# Patient Record
Sex: MALE | Race: OTHER | HISPANIC OR LATINO | Employment: FULL TIME | URBAN - METROPOLITAN AREA
[De-identification: names, ages, dates, MRNs, and addresses within clinical notes are randomized per-mention and may not be internally consistent; named-entity substitution may affect disease eponyms.]

---

## 2018-08-27 ENCOUNTER — OFFICE VISIT (OUTPATIENT)
Dept: FAMILY MEDICINE CLINIC | Facility: CLINIC | Age: 33
End: 2018-08-27
Payer: COMMERCIAL

## 2018-08-27 VITALS
BODY MASS INDEX: 23.95 KG/M2 | TEMPERATURE: 98 F | RESPIRATION RATE: 14 BRPM | HEIGHT: 66 IN | HEART RATE: 68 BPM | WEIGHT: 149 LBS | SYSTOLIC BLOOD PRESSURE: 126 MMHG | DIASTOLIC BLOOD PRESSURE: 74 MMHG

## 2018-08-27 DIAGNOSIS — R35.89 POLYURIA: Primary | ICD-10-CM

## 2018-08-27 DIAGNOSIS — N39.0 URINARY TRACT INFECTION WITH HEMATURIA, SITE UNSPECIFIED: Primary | ICD-10-CM

## 2018-08-27 DIAGNOSIS — R31.9 URINARY TRACT INFECTION WITH HEMATURIA, SITE UNSPECIFIED: Primary | ICD-10-CM

## 2018-08-27 DIAGNOSIS — R51.9 NONINTRACTABLE HEADACHE, UNSPECIFIED CHRONICITY PATTERN, UNSPECIFIED HEADACHE TYPE: ICD-10-CM

## 2018-08-27 LAB
SL AMB  POCT GLUCOSE, UA: ABNORMAL
SL AMB LEUKOCYTE ESTERASE,UA: ABNORMAL
SL AMB POCT BILIRUBIN,UA: 1
SL AMB POCT BLOOD,UA: ABNORMAL
SL AMB POCT CLARITY,UA: CLEAR
SL AMB POCT COLOR,UA: YELLOW
SL AMB POCT KETONES,UA: 5
SL AMB POCT NITRITE,UA: ABNORMAL
SL AMB POCT PH,UA: 5
SL AMB POCT SPECIFIC GRAVITY,UA: 1.03
SL AMB POCT URINE PROTEIN: 15
SL AMB POCT UROBILINOGEN: 0.2

## 2018-08-27 PROCEDURE — 3008F BODY MASS INDEX DOCD: CPT | Performed by: FAMILY MEDICINE

## 2018-08-27 PROCEDURE — 81002 URINALYSIS NONAUTO W/O SCOPE: CPT | Performed by: FAMILY MEDICINE

## 2018-08-27 PROCEDURE — 99204 OFFICE O/P NEW MOD 45 MIN: CPT | Performed by: FAMILY MEDICINE

## 2018-08-27 NOTE — PROGRESS NOTES
Assessment/Plan:    No problem-specific Assessment & Plan notes found for this encounter  Diagnoses and all orders for this visit:    Polyuria  Comments:  had seen doctor in [facility] who told him he had prostate problems  Orders:  -     Comprehensive metabolic panel; Future  -     CBC and differential; Future  -     Ambulatory referral to Urology; Future          Subjective:      Patient ID: Naheed Simon is a 29 y o  male  eval for urinary issues-goes to bathroom a lot, would like to see urology,also head trauma as child and still has scar, getting more headaches lately        The following portions of the patient's history were reviewed and updated as appropriate: allergies, current medications, past family history, past medical history, past social history, past surgical history and problem list     Review of Systems   Constitutional: Negative for activity change, appetite change and unexpected weight change  Respiratory: Negative for shortness of breath  Cardiovascular: Negative for chest pain  Gastrointestinal: Positive for abdominal pain  Genitourinary: Positive for difficulty urinating  Neurological: Positive for dizziness and headaches  Objective:      /74 (BP Location: Left arm, Patient Position: Sitting, Cuff Size: Adult)   Pulse 68   Temp 98 °F (36 7 °C) (Temporal)   Resp 14   Ht 5' 6 06" (1 678 m)   Wt 67 6 kg (149 lb)   BMI 24 00 kg/m²          Physical Exam   Constitutional: He appears well-developed and well-nourished  HENT:   Right Ear: Tympanic membrane normal    Left Ear: Tympanic membrane normal    Neck: No thyromegaly present  Cardiovascular: Normal rate, regular rhythm and normal heart sounds  Pulmonary/Chest: Breath sounds normal    Abdominal: There is no tenderness  Lymphadenopathy:     He has no cervical adenopathy  Skin:   Scar r side of head   Vitals reviewed

## 2018-09-07 ENCOUNTER — TELEPHONE (OUTPATIENT)
Dept: FAMILY MEDICINE CLINIC | Facility: CLINIC | Age: 33
End: 2018-09-07

## 2018-09-11 ENCOUNTER — TELEPHONE (OUTPATIENT)
Dept: FAMILY MEDICINE CLINIC | Facility: CLINIC | Age: 33
End: 2018-09-11

## 2018-09-11 ENCOUNTER — APPOINTMENT (OUTPATIENT)
Dept: LAB | Facility: HOSPITAL | Age: 33
End: 2018-09-11
Payer: COMMERCIAL

## 2018-09-11 DIAGNOSIS — R35.89 POLYURIA: ICD-10-CM

## 2018-09-11 LAB
ALBUMIN SERPL BCP-MCNC: 3.9 G/DL (ref 3.5–5)
ALP SERPL-CCNC: 38 U/L (ref 46–116)
ALT SERPL W P-5'-P-CCNC: 22 U/L (ref 12–78)
ANION GAP SERPL CALCULATED.3IONS-SCNC: 4 MMOL/L (ref 4–13)
AST SERPL W P-5'-P-CCNC: 21 U/L (ref 5–45)
BASOPHILS # BLD AUTO: 0.02 THOUSANDS/ΜL (ref 0–0.1)
BASOPHILS NFR BLD AUTO: 0 % (ref 0–1)
BILIRUB SERPL-MCNC: 0.64 MG/DL (ref 0.2–1)
BUN SERPL-MCNC: 19 MG/DL (ref 5–25)
CALCIUM SERPL-MCNC: 8.9 MG/DL (ref 8.3–10.1)
CHLORIDE SERPL-SCNC: 106 MMOL/L (ref 100–108)
CO2 SERPL-SCNC: 31 MMOL/L (ref 21–32)
CREAT SERPL-MCNC: 1.08 MG/DL (ref 0.6–1.3)
EOSINOPHIL # BLD AUTO: 0.15 THOUSAND/ΜL (ref 0–0.61)
EOSINOPHIL NFR BLD AUTO: 3 % (ref 0–6)
ERYTHROCYTE [DISTWIDTH] IN BLOOD BY AUTOMATED COUNT: 13.7 % (ref 11.6–15.1)
GFR SERPL CREATININE-BSD FRML MDRD: 92 ML/MIN/1.73SQ M
GLUCOSE P FAST SERPL-MCNC: 91 MG/DL (ref 65–99)
HCT VFR BLD AUTO: 46.7 % (ref 36.5–49.3)
HGB BLD-MCNC: 14.9 G/DL (ref 12–17)
IMM GRANULOCYTES # BLD AUTO: 0.01 THOUSAND/UL (ref 0–0.2)
IMM GRANULOCYTES NFR BLD AUTO: 0 % (ref 0–2)
LYMPHOCYTES # BLD AUTO: 1.86 THOUSANDS/ΜL (ref 0.6–4.47)
LYMPHOCYTES NFR BLD AUTO: 35 % (ref 14–44)
MCH RBC QN AUTO: 28.4 PG (ref 26.8–34.3)
MCHC RBC AUTO-ENTMCNC: 31.9 G/DL (ref 31.4–37.4)
MCV RBC AUTO: 89 FL (ref 82–98)
MONOCYTES # BLD AUTO: 0.43 THOUSAND/ΜL (ref 0.17–1.22)
MONOCYTES NFR BLD AUTO: 8 % (ref 4–12)
NEUTROPHILS # BLD AUTO: 2.78 THOUSANDS/ΜL (ref 1.85–7.62)
NEUTS SEG NFR BLD AUTO: 54 % (ref 43–75)
NRBC BLD AUTO-RTO: 0 /100 WBCS
PLATELET # BLD AUTO: 198 THOUSANDS/UL (ref 149–390)
PMV BLD AUTO: 10.4 FL (ref 8.9–12.7)
POTASSIUM SERPL-SCNC: 4 MMOL/L (ref 3.5–5.3)
PROT SERPL-MCNC: 7.4 G/DL (ref 6.4–8.2)
RBC # BLD AUTO: 5.24 MILLION/UL (ref 3.88–5.62)
SODIUM SERPL-SCNC: 141 MMOL/L (ref 136–145)
WBC # BLD AUTO: 5.25 THOUSAND/UL (ref 4.31–10.16)

## 2018-09-11 PROCEDURE — 80053 COMPREHEN METABOLIC PANEL: CPT

## 2018-09-11 PROCEDURE — 36415 COLL VENOUS BLD VENIPUNCTURE: CPT

## 2018-09-11 PROCEDURE — 85025 COMPLETE CBC W/AUTO DIFF WBC: CPT

## 2018-09-12 ENCOUNTER — TELEPHONE (OUTPATIENT)
Dept: FAMILY MEDICINE CLINIC | Facility: CLINIC | Age: 33
End: 2018-09-12

## 2018-09-12 NOTE — TELEPHONE ENCOUNTER
Pt returned call and was notified that he can schedule his CT scan, I gave him the number to central scheduling as well

## 2018-09-13 ENCOUNTER — TELEPHONE (OUTPATIENT)
Dept: FAMILY MEDICINE CLINIC | Facility: CLINIC | Age: 33
End: 2018-09-13

## 2018-09-24 ENCOUNTER — HOSPITAL ENCOUNTER (OUTPATIENT)
Dept: CT IMAGING | Facility: HOSPITAL | Age: 33
Discharge: HOME | End: 2018-09-24
Payer: COMMERCIAL

## 2018-09-24 DIAGNOSIS — R51.9 NONINTRACTABLE HEADACHE, UNSPECIFIED CHRONICITY PATTERN, UNSPECIFIED HEADACHE TYPE: ICD-10-CM

## 2018-09-24 PROCEDURE — 70450 CT HEAD/BRAIN W/O DYE: CPT

## 2018-09-25 ENCOUNTER — TELEPHONE (OUTPATIENT)
Dept: FAMILY MEDICINE CLINIC | Facility: CLINIC | Age: 33
End: 2018-09-25

## 2018-09-25 NOTE — TELEPHONE ENCOUNTER
----- Message from Flavia Zimmerman MD sent at 9/24/2018 10:24 PM EDT -----  Positive sinus disease-rec 3 weeks of amoxil 875 bid

## 2018-09-26 DIAGNOSIS — J32.9 SINUSITIS, UNSPECIFIED CHRONICITY, UNSPECIFIED LOCATION: Primary | ICD-10-CM

## 2018-09-26 RX ORDER — AMOXICILLIN 875 MG/1
875 TABLET, COATED ORAL 2 TIMES DAILY
Qty: 42 TABLET | Refills: 0 | Status: SHIPPED | OUTPATIENT
Start: 2018-09-26 | End: 2018-10-17

## 2018-09-26 RX ORDER — PANTOPRAZOLE SODIUM 40 MG/1
40 TABLET, DELAYED RELEASE ORAL
COMMUNITY
Start: 2018-05-31

## 2018-10-01 ENCOUNTER — OFFICE VISIT (OUTPATIENT)
Dept: UROLOGY | Facility: MEDICAL CENTER | Age: 33
End: 2018-10-01
Payer: COMMERCIAL

## 2018-10-01 VITALS
SYSTOLIC BLOOD PRESSURE: 122 MMHG | WEIGHT: 150 LBS | DIASTOLIC BLOOD PRESSURE: 74 MMHG | BODY MASS INDEX: 24.11 KG/M2 | HEIGHT: 66 IN

## 2018-10-01 DIAGNOSIS — R35.0 FREQUENCY OF MICTURITION: Primary | ICD-10-CM

## 2018-10-01 DIAGNOSIS — N41.0 PROSTATITIS, ACUTE: ICD-10-CM

## 2018-10-01 LAB
SL AMB  POCT GLUCOSE, UA: NORMAL
SL AMB LEUKOCYTE ESTERASE,UA: NORMAL
SL AMB POCT BILIRUBIN,UA: NORMAL
SL AMB POCT BLOOD,UA: NORMAL
SL AMB POCT CLARITY,UA: CLEAR
SL AMB POCT COLOR,UA: YELLOW
SL AMB POCT KETONES,UA: NORMAL
SL AMB POCT NITRITE,UA: NORMAL
SL AMB POCT PH,UA: 7.5
SL AMB POCT SPECIFIC GRAVITY,UA: 1.02
SL AMB POCT URINE PROTEIN: NORMAL
SL AMB POCT UROBILINOGEN: 1

## 2018-10-01 PROCEDURE — 99204 OFFICE O/P NEW MOD 45 MIN: CPT | Performed by: UROLOGY

## 2018-10-01 PROCEDURE — 81003 URINALYSIS AUTO W/O SCOPE: CPT | Performed by: UROLOGY

## 2018-10-01 RX ORDER — SULFAMETHOXAZOLE AND TRIMETHOPRIM 800; 160 MG/1; MG/1
1 TABLET ORAL EVERY 12 HOURS SCHEDULED
Qty: 60 TABLET | Refills: 0 | Status: SHIPPED | OUTPATIENT
Start: 2018-10-01 | End: 2018-10-31

## 2018-10-01 NOTE — LETTER
October 1, 2018     Monico Peng MD  [address]    Patient: Issa Frost   YOB: 1985   Date of Visit: 10/1/2018       Dear Dr Gemini Sharma:    Thank you for referring Issa Frost to me for evaluation  Below are my notes for this consultation  If you have questions, please do not hesitate to call me  I look forward to following your patient along with you  Sincerely,        Luz Quiñones MD        CC: No Recipients  Luz Quiñones MD  10/1/2018  4:07 PM  Sign at close encounter  Assessment/Plan:  1  Possible prostatitis causing symptoms of burning and urgency frequency  2   Bactrim twice per day for one month  3   If no better we would switched to Cipro for two weeks b i d  4   Follow-up six weeks  No problem-specific Assessment & Plan notes found for this encounter  Diagnoses and all orders for this visit:    Frequency of micturition  -     POCT urine dip auto non-scope  -     sulfamethoxazole-trimethoprim (BACTRIM DS) 800-160 mg per tablet; Take 1 tablet by mouth every 12 (twelve) hours for 30 days    Prostatitis, acute  -     sulfamethoxazole-trimethoprim (BACTRIM DS) 800-160 mg per tablet; Take 1 tablet by mouth every 12 (twelve) hours for 30 days          Subjective:      Patient ID: Issa Frost is a 29 y o  male  1-2 months of increased urinary frequency, urgency, small amounts voided, sense of poor emptying  Nocturia x2  Slight dysuria also  Denies fevers chills  Similar to episode in 2012 over he was treated at [address] [place] [address] urology for prostatitis  Patient states he had a cystoscopy then, but I cannot see any of those notes regarding a procedure on [facility]          The following portions of the patient's history were reviewed and updated as appropriate: allergies, current medications, past family history, past medical history, past social history, past surgical history and problem list     Review of Systems   All other systems reviewed and are negative  Objective:      /74   Ht 5' 6" (1 676 m)   Wt 68 kg (150 lb)   BMI 24 21 kg/m²           Physical Exam   Constitutional: He is oriented to person, place, and time  He appears well-developed and well-nourished  No distress  HENT:   Head: Normocephalic and atraumatic  Eyes: Conjunctivae are normal    Cardiovascular: Normal rate and regular rhythm  Pulmonary/Chest: Effort normal and breath sounds normal  No respiratory distress  He has no wheezes  Abdominal: Soft  Bowel sounds are normal  He exhibits no distension and no mass  There is no tenderness  Genitourinary:   Genitourinary Comments: Penis and testes normal    Perineum normal   Neurological: He is alert and oriented to person, place, and time  Skin: Skin is warm and dry  He is not diaphoretic

## 2018-10-01 NOTE — PROGRESS NOTES
Assessment/Plan:  1  Possible prostatitis causing symptoms of burning and urgency frequency  2   Bactrim twice per day for one month  3   If no better we would switched to Cipro for two weeks b i d  4   Follow-up six weeks  No problem-specific Assessment & Plan notes found for this encounter  Diagnoses and all orders for this visit:    Frequency of micturition  -     POCT urine dip auto non-scope  -     sulfamethoxazole-trimethoprim (BACTRIM DS) 800-160 mg per tablet; Take 1 tablet by mouth every 12 (twelve) hours for 30 days    Prostatitis, acute  -     sulfamethoxazole-trimethoprim (BACTRIM DS) 800-160 mg per tablet; Take 1 tablet by mouth every 12 (twelve) hours for 30 days          Subjective:      Patient ID: Abe Vines is a 29 y o  male  1-2 months of increased urinary frequency, urgency, small amounts voided, sense of poor emptying  Nocturia x2  Slight dysuria also  Denies fevers chills  Similar to episode in 2012 over he was treated at [address] [place] [address] urology for prostatitis  Patient states he had a cystoscopy then, but I cannot see any of those notes regarding a procedure on [facility]  The following portions of the patient's history were reviewed and updated as appropriate: allergies, current medications, past family history, past medical history, past social history, past surgical history and problem list     Review of Systems   All other systems reviewed and are negative  Objective:      /74   Ht 5' 6" (1 676 m)   Wt 68 kg (150 lb)   BMI 24 21 kg/m²          Physical Exam   Constitutional: He is oriented to person, place, and time  He appears well-developed and well-nourished  No distress  HENT:   Head: Normocephalic and atraumatic  Eyes: Conjunctivae are normal    Cardiovascular: Normal rate and regular rhythm  Pulmonary/Chest: Effort normal and breath sounds normal  No respiratory distress  He has no wheezes  Abdominal: Soft   Bowel sounds are normal  He exhibits no distension and no mass  There is no tenderness  Genitourinary:   Genitourinary Comments: Penis and testes normal    Perineum normal   Neurological: He is alert and oriented to person, place, and time  Skin: Skin is warm and dry  He is not diaphoretic

## 2018-12-03 ENCOUNTER — OFFICE VISIT (OUTPATIENT)
Dept: UROLOGY | Facility: MEDICAL CENTER | Age: 33
End: 2018-12-03
Payer: COMMERCIAL

## 2018-12-03 VITALS
HEART RATE: 67 BPM | WEIGHT: 145 LBS | DIASTOLIC BLOOD PRESSURE: 80 MMHG | HEIGHT: 66 IN | SYSTOLIC BLOOD PRESSURE: 122 MMHG | BODY MASS INDEX: 23.3 KG/M2

## 2018-12-03 DIAGNOSIS — N41.0 PROSTATITIS, ACUTE: ICD-10-CM

## 2018-12-03 DIAGNOSIS — R31.29 MICROHEMATURIA: Primary | ICD-10-CM

## 2018-12-03 LAB
SL AMB  POCT GLUCOSE, UA: ABNORMAL
SL AMB LEUKOCYTE ESTERASE,UA: ABNORMAL
SL AMB POCT BILIRUBIN,UA: ABNORMAL
SL AMB POCT BLOOD,UA: ABNORMAL
SL AMB POCT CLARITY,UA: CLEAR
SL AMB POCT COLOR,UA: YELLOW
SL AMB POCT KETONES,UA: ABNORMAL
SL AMB POCT NITRITE,UA: ABNORMAL
SL AMB POCT PH,UA: 6
SL AMB POCT SPECIFIC GRAVITY,UA: 1.03
SL AMB POCT URINE PROTEIN: ABNORMAL
SL AMB POCT UROBILINOGEN: 0.2

## 2018-12-03 PROCEDURE — 81003 URINALYSIS AUTO W/O SCOPE: CPT | Performed by: UROLOGY

## 2018-12-03 PROCEDURE — 99214 OFFICE O/P EST MOD 30 MIN: CPT | Performed by: UROLOGY

## 2018-12-03 RX ORDER — DOXYCYCLINE HYCLATE 100 MG/1
100 CAPSULE ORAL EVERY 12 HOURS SCHEDULED
Qty: 60 CAPSULE | Refills: 0 | Status: SHIPPED | OUTPATIENT
Start: 2018-12-03 | End: 2020-11-11 | Stop reason: SDUPTHER

## 2018-12-03 NOTE — LETTER
December 3, 2018     Jeanette Dai MD  [address]    Patient: Marly Lopez   YOB: 1985   Date of Visit: 12/3/2018       Dear Dr Yoko Velasquez:    Thank you for referring Marly Lopez to me for evaluation  Below are my notes for this consultation  If you have questions, please do not hesitate to call me  I look forward to following your patient along with you  Sincerely,        Sarah Doss MD        CC: No Recipients  Sarah Doss MD  12/3/2018  2:31 PM  Sign at close encounter  Assessment/Plan:  Possible prostatitis, chronic lower abdominal pain  Doxycycline 100 b i d  Times 30 days  CT scan  Follow-up six weeks  No problem-specific Assessment & Plan notes found for this encounter  Diagnoses and all orders for this visit:    Microhematuria  -     POCT urine dip auto non-scope  -     CT abdomen pelvis w wo contrast; Future    Prostatitis, acute  -     doxycycline hyclate (VIBRAMYCIN) 100 mg capsule; Take 1 capsule (100 mg total) by mouth every 12 (twelve) hours for 10 days          Subjective:      Patient ID: Marly Lopez is a 34 y o  male  1  Follow-up for lower abdominal pain, consistent with prostatitis  No help with Bactrim  Occasional perineal pain during urination, increased frequency of urination and some dysuria  The following portions of the patient's history were reviewed and updated as appropriate: allergies, current medications, past family history, past medical history, past social history, past surgical history and problem list     Review of Systems   All other systems reviewed and are negative  Objective:      /80 (BP Location: Left arm, Patient Position: Sitting)   Pulse 67   Ht 5' 6" (1 676 m)   Wt 65 8 kg (145 lb)   BMI 23 40 kg/m²           Physical Exam   Constitutional: He is oriented to person, place, and time  He appears well-developed and well-nourished  No distress  HENT:   Head: Normocephalic and atraumatic  Eyes: Conjunctivae are normal    Cardiovascular: Normal rate and regular rhythm  Pulmonary/Chest: Effort normal and breath sounds normal  No respiratory distress  He has no wheezes  Abdominal: Soft  Bowel sounds are normal  He exhibits no distension and no mass  There is no tenderness  Genitourinary:   Genitourinary Comments: Penis testes no lesions on tender lower abdomen and perineum   Neurological: He is alert and oriented to person, place, and time  Skin: Skin is warm and dry  He is not diaphoretic

## 2018-12-03 NOTE — PROGRESS NOTES
Assessment/Plan:  Possible prostatitis, chronic lower abdominal pain  Doxycycline 100 b i d  Times 30 days  CT scan  Follow-up six weeks  No problem-specific Assessment & Plan notes found for this encounter  Diagnoses and all orders for this visit:    Microhematuria  -     POCT urine dip auto non-scope  -     CT abdomen pelvis w wo contrast; Future    Prostatitis, acute  -     doxycycline hyclate (VIBRAMYCIN) 100 mg capsule; Take 1 capsule (100 mg total) by mouth every 12 (twelve) hours for 10 days          Subjective:      Patient ID: Samira Barboza is a 34 y o  male  1  Follow-up for lower abdominal pain, consistent with prostatitis  No help with Bactrim  Occasional perineal pain during urination, increased frequency of urination and some dysuria  The following portions of the patient's history were reviewed and updated as appropriate: allergies, current medications, past family history, past medical history, past social history, past surgical history and problem list     Review of Systems   All other systems reviewed and are negative  Objective:      /80 (BP Location: Left arm, Patient Position: Sitting)   Pulse 67   Ht 5' 6" (1 676 m)   Wt 65 8 kg (145 lb)   BMI 23 40 kg/m²          Physical Exam   Constitutional: He is oriented to person, place, and time  He appears well-developed and well-nourished  No distress  HENT:   Head: Normocephalic and atraumatic  Eyes: Conjunctivae are normal    Cardiovascular: Normal rate and regular rhythm  Pulmonary/Chest: Effort normal and breath sounds normal  No respiratory distress  He has no wheezes  Abdominal: Soft  Bowel sounds are normal  He exhibits no distension and no mass  There is no tenderness  Genitourinary:   Genitourinary Comments: Penis testes no lesions on tender lower abdomen and perineum   Neurological: He is alert and oriented to person, place, and time  Skin: Skin is warm and dry   He is not diaphoretic

## 2019-05-22 ENCOUNTER — OFFICE VISIT (OUTPATIENT)
Dept: FAMILY MEDICINE CLINIC | Facility: CLINIC | Age: 34
End: 2019-05-22
Payer: COMMERCIAL

## 2019-05-22 VITALS
SYSTOLIC BLOOD PRESSURE: 112 MMHG | BODY MASS INDEX: 24.75 KG/M2 | HEART RATE: 60 BPM | HEIGHT: 66 IN | RESPIRATION RATE: 18 BRPM | TEMPERATURE: 98.6 F | WEIGHT: 154 LBS | DIASTOLIC BLOOD PRESSURE: 76 MMHG

## 2019-05-22 DIAGNOSIS — Z00.00 ANNUAL PHYSICAL EXAM: Primary | ICD-10-CM

## 2019-05-22 DIAGNOSIS — Z11.3 SCREEN FOR STD (SEXUALLY TRANSMITTED DISEASE): ICD-10-CM

## 2019-05-22 DIAGNOSIS — H61.23 BILATERAL IMPACTED CERUMEN: ICD-10-CM

## 2019-05-22 PROCEDURE — 69209 REMOVE IMPACTED EAR WAX UNI: CPT | Performed by: NURSE PRACTITIONER

## 2019-05-22 PROCEDURE — 99395 PREV VISIT EST AGE 18-39: CPT | Performed by: NURSE PRACTITIONER

## 2019-05-29 ENCOUNTER — APPOINTMENT (OUTPATIENT)
Dept: LAB | Facility: HOSPITAL | Age: 34
End: 2019-05-29
Payer: COMMERCIAL

## 2019-05-29 DIAGNOSIS — Z11.3 SCREEN FOR STD (SEXUALLY TRANSMITTED DISEASE): ICD-10-CM

## 2019-05-29 DIAGNOSIS — Z00.00 ANNUAL PHYSICAL EXAM: ICD-10-CM

## 2019-05-29 LAB
25(OH)D3 SERPL-MCNC: 13.4 NG/ML (ref 30–100)
ALBUMIN SERPL BCP-MCNC: 3.8 G/DL (ref 3.5–5)
ALP SERPL-CCNC: 41 U/L (ref 46–116)
ALT SERPL W P-5'-P-CCNC: 29 U/L (ref 12–78)
ANION GAP SERPL CALCULATED.3IONS-SCNC: 10 MMOL/L (ref 4–13)
AST SERPL W P-5'-P-CCNC: 25 U/L (ref 5–45)
BILIRUB SERPL-MCNC: 0.39 MG/DL (ref 0.2–1)
BUN SERPL-MCNC: 18 MG/DL (ref 5–25)
CALCIUM SERPL-MCNC: 9.4 MG/DL (ref 8.3–10.1)
CHLORIDE SERPL-SCNC: 106 MMOL/L (ref 100–108)
CHOLEST SERPL-MCNC: 151 MG/DL (ref 50–200)
CO2 SERPL-SCNC: 27 MMOL/L (ref 21–32)
CREAT SERPL-MCNC: 1.1 MG/DL (ref 0.6–1.3)
ERYTHROCYTE [DISTWIDTH] IN BLOOD BY AUTOMATED COUNT: 13.7 % (ref 11.6–15.1)
GFR SERPL CREATININE-BSD FRML MDRD: 89 ML/MIN/1.73SQ M
GLUCOSE P FAST SERPL-MCNC: 92 MG/DL (ref 65–99)
HCT VFR BLD AUTO: 47.1 % (ref 36.5–49.3)
HDLC SERPL-MCNC: 52 MG/DL (ref 40–60)
HGB BLD-MCNC: 15.1 G/DL (ref 12–17)
LDLC SERPL CALC-MCNC: 88 MG/DL (ref 0–100)
MCH RBC QN AUTO: 29 PG (ref 26.8–34.3)
MCHC RBC AUTO-ENTMCNC: 32.1 G/DL (ref 31.4–37.4)
MCV RBC AUTO: 91 FL (ref 82–98)
NONHDLC SERPL-MCNC: 99 MG/DL
PLATELET # BLD AUTO: 203 THOUSANDS/UL (ref 149–390)
PMV BLD AUTO: 10.6 FL (ref 8.9–12.7)
POTASSIUM SERPL-SCNC: 4.1 MMOL/L (ref 3.5–5.3)
PROT SERPL-MCNC: 7.5 G/DL (ref 6.4–8.2)
RBC # BLD AUTO: 5.2 MILLION/UL (ref 3.88–5.62)
SODIUM SERPL-SCNC: 143 MMOL/L (ref 136–145)
TRIGL SERPL-MCNC: 57 MG/DL
TSH SERPL DL<=0.05 MIU/L-ACNC: 0.76 UIU/ML (ref 0.36–3.74)
WBC # BLD AUTO: 5.43 THOUSAND/UL (ref 4.31–10.16)

## 2019-05-29 PROCEDURE — 80061 LIPID PANEL: CPT

## 2019-05-29 PROCEDURE — 85027 COMPLETE CBC AUTOMATED: CPT

## 2019-05-29 PROCEDURE — 80053 COMPREHEN METABOLIC PANEL: CPT

## 2019-05-29 PROCEDURE — 84443 ASSAY THYROID STIM HORMONE: CPT

## 2019-05-29 PROCEDURE — 36415 COLL VENOUS BLD VENIPUNCTURE: CPT

## 2019-05-29 PROCEDURE — 87389 HIV-1 AG W/HIV-1&-2 AB AG IA: CPT

## 2019-05-29 PROCEDURE — 82306 VITAMIN D 25 HYDROXY: CPT

## 2019-05-31 LAB — HIV 1+2 AB+HIV1 P24 AG SERPL QL IA: NORMAL

## 2019-06-03 DIAGNOSIS — E55.9 VITAMIN D DEFICIENCY: Primary | ICD-10-CM

## 2019-06-03 RX ORDER — ERGOCALCIFEROL 1.25 MG/1
50000 CAPSULE ORAL WEEKLY
Qty: 8 CAPSULE | Refills: 0 | Status: SHIPPED | OUTPATIENT
Start: 2019-06-03 | End: 2020-11-11 | Stop reason: ALTCHOICE

## 2019-06-03 RX ORDER — ACETAMINOPHEN 160 MG
2000 TABLET,DISINTEGRATING ORAL DAILY
Qty: 90 CAPSULE | Refills: 0 | Status: SHIPPED | OUTPATIENT
Start: 2019-08-05 | End: 2020-11-11 | Stop reason: ALTCHOICE

## 2019-06-04 ENCOUNTER — TELEPHONE (OUTPATIENT)
Dept: FAMILY MEDICINE CLINIC | Facility: CLINIC | Age: 34
End: 2019-06-04

## 2019-11-11 ENCOUNTER — OFFICE VISIT (OUTPATIENT)
Dept: FAMILY MEDICINE CLINIC | Facility: CLINIC | Age: 34
End: 2019-11-11
Payer: COMMERCIAL

## 2019-11-11 VITALS
BODY MASS INDEX: 24.43 KG/M2 | WEIGHT: 152 LBS | DIASTOLIC BLOOD PRESSURE: 70 MMHG | HEART RATE: 62 BPM | HEIGHT: 66 IN | SYSTOLIC BLOOD PRESSURE: 100 MMHG

## 2019-11-11 DIAGNOSIS — R30.0 DYSURIA: Primary | ICD-10-CM

## 2019-11-11 DIAGNOSIS — Z11.3 SCREEN FOR STD (SEXUALLY TRANSMITTED DISEASE): ICD-10-CM

## 2019-11-11 DIAGNOSIS — Z11.4 SCREENING FOR HIV (HUMAN IMMUNODEFICIENCY VIRUS): ICD-10-CM

## 2019-11-11 DIAGNOSIS — Z23 ENCOUNTER FOR IMMUNIZATION: ICD-10-CM

## 2019-11-11 DIAGNOSIS — R10.9 LEFT FLANK PAIN: ICD-10-CM

## 2019-11-11 PROBLEM — A60.01 HERPES SIMPLEX INFECTION OF PENIS: Status: ACTIVE | Noted: 2017-09-29

## 2019-11-11 PROBLEM — R35.0 FREQUENCY OF MICTURITION: Status: RESOLVED | Noted: 2018-10-01 | Resolved: 2019-11-11

## 2019-11-11 PROBLEM — N41.0 PROSTATITIS, ACUTE: Status: RESOLVED | Noted: 2018-10-01 | Resolved: 2019-11-11

## 2019-11-11 PROBLEM — R31.29 MICROHEMATURIA: Status: RESOLVED | Noted: 2018-12-03 | Resolved: 2019-11-11

## 2019-11-11 LAB
SL AMB  POCT GLUCOSE, UA: NORMAL
SL AMB LEUKOCYTE ESTERASE,UA: NORMAL
SL AMB POCT BILIRUBIN,UA: NORMAL
SL AMB POCT BLOOD,UA: NORMAL
SL AMB POCT CLARITY,UA: CLEAR
SL AMB POCT COLOR,UA: YELLOW
SL AMB POCT KETONES,UA: NORMAL
SL AMB POCT NITRITE,UA: NORMAL
SL AMB POCT PH,UA: 5.5
SL AMB POCT SPECIFIC GRAVITY,UA: 1.02
SL AMB POCT URINE PROTEIN: NORMAL
SL AMB POCT UROBILINOGEN: 0.2

## 2019-11-11 PROCEDURE — 87491 CHLMYD TRACH DNA AMP PROBE: CPT | Performed by: NURSE PRACTITIONER

## 2019-11-11 PROCEDURE — 81002 URINALYSIS NONAUTO W/O SCOPE: CPT | Performed by: NURSE PRACTITIONER

## 2019-11-11 PROCEDURE — 99213 OFFICE O/P EST LOW 20 MIN: CPT | Performed by: NURSE PRACTITIONER

## 2019-11-11 PROCEDURE — 87591 N.GONORRHOEAE DNA AMP PROB: CPT | Performed by: NURSE PRACTITIONER

## 2019-11-11 PROCEDURE — 87086 URINE CULTURE/COLONY COUNT: CPT | Performed by: NURSE PRACTITIONER

## 2019-11-11 NOTE — PROGRESS NOTES
Assessment and Plan:    Problem List Items Addressed This Visit     None      Visit Diagnoses     Dysuria    -  Primary    Relevant Orders    Chlamydia/GC amplified DNA by PCR    POCT urine dip (Completed)    Urine culture    Left flank pain        Relevant Orders    Chlamydia/GC amplified DNA by PCR    POCT urine dip (Completed)    Urine culture    Screening for HIV (human immunodeficiency virus)        Relevant Orders    Human Immunodeficiency Virus 1/2 Antigen / Antibody ( Fourth Generation) with Reflex Testing    Screen for STD (sexually transmitted disease)        Relevant Orders    RPR    Hepatitis C antibody    Chlamydia/GC amplified DNA by PCR    Encounter for immunization        Relevant Orders    influenza vaccine, 8111-8386, quadrivalent, 0 5 mL, preservative-free, for adult and pediatric patients 6 mos+ (AFLURIA, FLUARIX, FLULAVAL, FLUZONE)    Tdap vaccine greater than or equal to 6yo IM                 Diagnoses and all orders for this visit:    Dysuria  -     Chlamydia/GC amplified DNA by PCR; Future  -     POCT urine dip  -     Urine culture; Future  -     Chlamydia/GC amplified DNA by PCR  -     Urine culture    Left flank pain  -     Chlamydia/GC amplified DNA by PCR; Future  -     POCT urine dip  -     Urine culture; Future  -     Chlamydia/GC amplified DNA by PCR  -     Urine culture    Screening for HIV (human immunodeficiency virus)  -     Human Immunodeficiency Virus 1/2 Antigen / Antibody ( Fourth Generation) with Reflex Testing; Future    Screen for STD (sexually transmitted disease)  -     RPR; Future  -     Hepatitis C antibody; Future  -     Chlamydia/GC amplified DNA by PCR;  Future  -     Chlamydia/GC amplified DNA by PCR    Encounter for immunization  -     Cancel: DTAP VACCINE LESS THAN 8YO IM (Infanrix)  -     influenza vaccine, 5540-1662, quadrivalent, 0 5 mL, preservative-free, for adult and pediatric patients 6 mos+ (AFLURIA, FLUARIX, FLULAVAL, FLUZONE)  -     Tdap vaccine greater than or equal to 6yo IM              Subjective:      Patient ID: Adamaris Medrano is a 30 y o  male  CC:    Chief Complaint   Patient presents with    Urinary Frequency       HPI:    Urinary Frequency    This is a new problem  The current episode started 1 to 4 weeks ago  The problem occurs every urination  The quality of the pain is described as aching and burning  There has been no fever  Associated symptoms include flank pain (let side ) and frequency  Pertinent negatives include no hematuria  The following portions of the patient's history were reviewed and updated as appropriate: allergies, current medications, past family history, past medical history, past social history, past surgical history and problem list       Review of Systems   Constitutional: Negative for unexpected weight change  Eyes: Negative for visual disturbance  Respiratory: Negative for chest tightness and shortness of breath  Cardiovascular: Negative for chest pain and palpitations  Gastrointestinal: Positive for abdominal pain  Genitourinary: Positive for dysuria, flank pain (let side ) and frequency  Negative for difficulty urinating, hematuria, penile swelling, scrotal swelling and testicular pain  Data to review:       Objective:    Vitals:    11/11/19 0923   BP: 100/70   BP Location: Left arm   Patient Position: Sitting   Cuff Size: Standard   Pulse: 62   Weight: 68 9 kg (152 lb)   Height: 5' 6" (1 676 m)        Physical Exam   Constitutional: He is oriented to person, place, and time  Vital signs are normal  He appears well-developed and well-nourished  He does not appear ill  HENT:   Head: Normocephalic and atraumatic  Eyes: Pupils are equal    Cardiovascular: Normal rate, regular rhythm, S1 normal and S2 normal    No murmur heard  Pulmonary/Chest: Effort normal and breath sounds normal  No respiratory distress  Abdominal: Soft   Normal appearance and bowel sounds are normal  There is no tenderness  There is no CVA tenderness  Neurological: He is alert and oriented to person, place, and time  Psychiatric: He has a normal mood and affect   Thought content normal

## 2019-11-12 LAB — BACTERIA UR CULT: NORMAL

## 2019-11-13 ENCOUNTER — APPOINTMENT (OUTPATIENT)
Dept: LAB | Facility: HOSPITAL | Age: 34
End: 2019-11-13
Payer: COMMERCIAL

## 2019-11-13 DIAGNOSIS — Z11.3 SCREEN FOR STD (SEXUALLY TRANSMITTED DISEASE): ICD-10-CM

## 2019-11-13 DIAGNOSIS — Z11.4 SCREENING FOR HIV (HUMAN IMMUNODEFICIENCY VIRUS): ICD-10-CM

## 2019-11-13 LAB
C TRACH DNA SPEC QL NAA+PROBE: NEGATIVE
HCV AB SER QL: NORMAL
N GONORRHOEA DNA SPEC QL NAA+PROBE: NEGATIVE
RPR SER QL: NORMAL

## 2019-11-13 PROCEDURE — 36415 COLL VENOUS BLD VENIPUNCTURE: CPT

## 2019-11-13 PROCEDURE — 86592 SYPHILIS TEST NON-TREP QUAL: CPT

## 2019-11-13 PROCEDURE — 86803 HEPATITIS C AB TEST: CPT

## 2019-11-13 PROCEDURE — 87389 HIV-1 AG W/HIV-1&-2 AB AG IA: CPT

## 2019-11-14 LAB — HIV 1+2 AB+HIV1 P24 AG SERPL QL IA: NORMAL

## 2020-08-28 ENCOUNTER — TELEPHONE (OUTPATIENT)
Dept: FAMILY MEDICINE CLINIC | Facility: CLINIC | Age: 35
End: 2020-08-28

## 2020-08-28 DIAGNOSIS — Z13.6 SCREENING FOR CARDIOVASCULAR CONDITION: ICD-10-CM

## 2020-08-28 DIAGNOSIS — E55.9 VITAMIN D DEFICIENCY: Primary | ICD-10-CM

## 2020-08-28 DIAGNOSIS — E55.9 VITAMIN D DEFICIENCY: ICD-10-CM

## 2020-08-28 RX ORDER — ERGOCALCIFEROL 1.25 MG/1
50000 CAPSULE ORAL WEEKLY
Qty: 8 CAPSULE | Refills: 0 | OUTPATIENT
Start: 2020-08-28

## 2020-08-28 RX ORDER — ACETAMINOPHEN 160 MG
2000 TABLET,DISINTEGRATING ORAL DAILY
Qty: 90 CAPSULE | Refills: 0 | OUTPATIENT
Start: 2020-08-28

## 2020-08-28 NOTE — TELEPHONE ENCOUNTER
Shouldn't be on otc and prescription Vit D-pt should speak to [facility] Re: this issue

## 2020-08-31 NOTE — TELEPHONE ENCOUNTER
PATIENT IS CALLING WOULD LIKE SCRIPT FOR VITAMIN D TO BE SENT TO PHARMACY , PLEASE SEND TO [facility] [address]  PLEASE CALL PATIENT BACK [phone] IF YOU HAVE ANY QUESTIONS

## 2020-09-01 NOTE — TELEPHONE ENCOUNTER
Patient needs to have blood work completed 1st I have entered lab slips  Once blood work complete will decide how much vitamin-D he needs

## 2020-09-02 DIAGNOSIS — E55.9 VITAMIN D DEFICIENCY: ICD-10-CM

## 2020-09-02 RX ORDER — ACETAMINOPHEN 160 MG
2000 TABLET,DISINTEGRATING ORAL DAILY
Qty: 90 CAPSULE | Refills: 0 | OUTPATIENT
Start: 2020-09-02

## 2020-09-14 ENCOUNTER — OFFICE VISIT (OUTPATIENT)
Dept: FAMILY MEDICINE CLINIC | Facility: CLINIC | Age: 35
End: 2020-09-14
Payer: COMMERCIAL

## 2020-09-14 VITALS
HEART RATE: 56 BPM | SYSTOLIC BLOOD PRESSURE: 108 MMHG | BODY MASS INDEX: 23.78 KG/M2 | WEIGHT: 148 LBS | HEIGHT: 66 IN | RESPIRATION RATE: 16 BRPM | TEMPERATURE: 97.6 F | DIASTOLIC BLOOD PRESSURE: 62 MMHG

## 2020-09-14 DIAGNOSIS — H61.22 IMPACTED CERUMEN OF LEFT EAR: Primary | ICD-10-CM

## 2020-09-14 DIAGNOSIS — N52.9 ERECTILE DYSFUNCTION, UNSPECIFIED ERECTILE DYSFUNCTION TYPE: ICD-10-CM

## 2020-09-14 DIAGNOSIS — K59.00 CONSTIPATION, UNSPECIFIED CONSTIPATION TYPE: ICD-10-CM

## 2020-09-14 DIAGNOSIS — Z00.00 ANNUAL PHYSICAL EXAM: ICD-10-CM

## 2020-09-14 DIAGNOSIS — G47.00 INSOMNIA, UNSPECIFIED TYPE: ICD-10-CM

## 2020-09-14 PROCEDURE — 99213 OFFICE O/P EST LOW 20 MIN: CPT | Performed by: NURSE PRACTITIONER

## 2020-09-14 PROCEDURE — 69210 REMOVE IMPACTED EAR WAX UNI: CPT | Performed by: NURSE PRACTITIONER

## 2020-09-14 PROCEDURE — 99395 PREV VISIT EST AGE 18-39: CPT | Performed by: NURSE PRACTITIONER

## 2020-09-14 RX ORDER — TRAZODONE HYDROCHLORIDE 50 MG/1
50 TABLET ORAL
Qty: 30 TABLET | Refills: 1 | Status: SHIPPED | OUTPATIENT
Start: 2020-09-14

## 2020-09-14 NOTE — PROGRESS NOTES
Assessment and Plan:    Problem List Items Addressed This Visit        Other    Erectile dysfunction     Discussed with patient most likely her day to increased stress and lack of sleep  Will check testosterone level follow-up in 2 weeks         Relevant Orders    Testosterone, free, total    Constipation     Patient would like to trial taking Linzess as needed  Low-dose prescription sent we did discuss high-fiber increased water intake  Recheck in 2 weeks         Relevant Medications    linaCLOtide 72 MCG CAPS    Insomnia     Low-dose trazodone as trial   Would like patient to follow-up in 2 weeks  Relevant Medications    traZODone (DESYREL) 50 mg tablet      Other Visit Diagnoses     Impacted cerumen of left ear    -  Primary    Relevant Orders    Ear cerumen removal    Annual physical exam        Relevant Orders    Lipid panel    Comprehensive metabolic panel    TSH, 3rd generation with Free T4 reflex    CBC and differential                 Diagnoses and all orders for this visit:    Impacted cerumen of left ear  -     Ear cerumen removal    Annual physical exam  -     Lipid panel  -     Comprehensive metabolic panel; Future  -     TSH, 3rd generation with Free T4 reflex; Future  -     CBC and differential; Future    Erectile dysfunction, unspecified erectile dysfunction type  -     Testosterone, free, total; Future    Constipation, unspecified constipation type  -     linaCLOtide 72 MCG CAPS; Take 1 caplet by mouth daily as needed (constipation)    Insomnia, unspecified type  -     traZODone (DESYREL) 50 mg tablet; Take 1 tablet (50 mg total) by mouth daily at bedtime as needed for sleep              Subjective:      Patient ID: James Lux is a 30 y o  male  CC:    Chief Complaint   Patient presents with    Physical Exam     Patient present today for his Annual Physical exam  Patient will like to get blood work done to check his Kidneys      Dizziness     Patient complaints of dizziness and blurry vision in the AM        HPI:    Patient reports he getting around 4-5 hours per sleep  He is waking up about 2-3 times per day  Patient is working during the day 12 hours  He reports some on and off dizzy spells in the morning at times  Patient also states he concerned because he has ongoing issues with ejaculation and having trouble with erection for last several months  He reports having issue with "going a second round"  She also reports that he has been having difficulty with constipation  And on and off abdominal pain  Patient would like to get blood work done to check his health status  The following portions of the patient's history were reviewed and updated as appropriate: allergies, current medications, past family history, past medical history, past social history, past surgical history and problem list       Review of Systems   Constitutional: Negative for chills, diaphoresis, fatigue and fever  HENT: Negative  Eyes: Negative for visual disturbance  Respiratory: Negative for chest tightness and shortness of breath  Cardiovascular: Negative for chest pain  Gastrointestinal: Positive for abdominal pain, constipation and diarrhea  Negative for nausea and vomiting  Genitourinary: Negative for difficulty urinating  Ejaculation and erection concerns    Musculoskeletal: Negative for joint swelling  Skin: Negative for rash  Neurological: Positive for light-headedness  Negative for headaches  Psychiatric/Behavioral: Positive for sleep disturbance  Negative for dysphoric mood and suicidal ideas  The patient is nervous/anxious  Data to review:       Objective:    Vitals:    09/14/20 1512   BP: 108/62   BP Location: Left arm   Patient Position: Sitting   Cuff Size: Standard   Pulse: 56   Resp: 16   Temp: 97 6 °F (36 4 °C)   TempSrc: Temporal   Weight: 67 1 kg (148 lb)   Height: 5' 6" (1 676 m)        Physical Exam  Vitals signs and nursing note reviewed  Constitutional:       General: He is not in acute distress  Appearance: He is well-developed  He is not diaphoretic  HENT:      Head: Normocephalic and atraumatic  Right Ear: Tympanic membrane and external ear normal       Left Ear: External ear normal  There is impacted cerumen  Nose: Nose normal       Right Turbinates: Enlarged and swollen  Left Turbinates: Enlarged and swollen  Mouth/Throat:      Pharynx: Uvula midline  No oropharyngeal exudate  Eyes:      General: No scleral icterus  Conjunctiva/sclera: Conjunctivae normal       Pupils: Pupils are equal, round, and reactive to light  Neck:      Musculoskeletal: Normal range of motion  Thyroid: No thyromegaly  Vascular: No JVD  Cardiovascular:      Rate and Rhythm: Normal rate and regular rhythm  Heart sounds: Normal heart sounds  Pulmonary:      Effort: Pulmonary effort is normal  No respiratory distress  Breath sounds: Normal breath sounds  Abdominal:      General: Bowel sounds are normal  There is no distension  Palpations: Abdomen is soft  Tenderness: There is no abdominal tenderness  Musculoskeletal: Normal range of motion  Lymphadenopathy:      Cervical: No cervical adenopathy  Skin:     General: Skin is warm and dry  Capillary Refill: Capillary refill takes less than 2 seconds  Neurological:      Mental Status: He is alert and oriented to person, place, and time  Coordination: Coordination normal       Gait: Gait normal       Deep Tendon Reflexes:      Reflex Scores:       Tricep reflexes are 2+ on the right side and 2+ on the left side  Bicep reflexes are 2+ on the right side and 2+ on the left side  Patellar reflexes are 2+ on the right side and 2+ on the left side  Psychiatric:         Thought Content:  Thought content normal              Ear cerumen removal    Date/Time: 9/14/2020 4:13 PM  Performed by: GIULIANO Fontana  Authorized by: Elizabeth Buenrostro [address]      Patient location:  Clinic  Consent:     Consent obtained:  Verbal    Consent given by:  Patient    Risks discussed:  Bleeding, dizziness and incomplete removal  Procedure details:     Location:  L ear    Procedure type: irrigation with instrumentation      Instrumentation: curette      Approach:  Natural orifice  Post-procedure details:     Complication:  None    Hearing quality:  Improved    Patient tolerance of procedure:   Tolerated well, no immediate complications

## 2020-09-14 NOTE — ASSESSMENT & PLAN NOTE
Discussed with patient most likely her day to increased stress and lack of sleep    Will check testosterone level follow-up in 2 weeks

## 2020-09-14 NOTE — PATIENT INSTRUCTIONS

## 2020-09-14 NOTE — ASSESSMENT & PLAN NOTE
Patient would like to trial taking Linzess as needed  Low-dose prescription sent we did discuss high-fiber increased water intake    Recheck in 2 weeks

## 2020-09-14 NOTE — PROGRESS NOTES
ADULT ANNUAL [facility] PRIMARY CARE    NAME: Marco Antonio Palacios  AGE: 30 y o  SEX: male  : 1985     DATE: 2020     Assessment and Plan:     Problem List Items Addressed This Visit        Other    Erectile dysfunction     Discussed with patient most likely her day to increased stress and lack of sleep  Will check testosterone level follow-up in 2 weeks         Relevant Orders    Testosterone, free, total    Constipation     Patient would like to trial taking Linzess as needed  Low-dose prescription sent we did discuss high-fiber increased water intake  Recheck in 2 weeks         Relevant Medications    linaCLOtide 72 MCG CAPS    Insomnia     Low-dose trazodone as trial   Would like patient to follow-up in 2 weeks  Relevant Medications    traZODone (DESYREL) 50 mg tablet      Other Visit Diagnoses     Impacted cerumen of left ear    -  Primary    Relevant Orders    Ear cerumen removal    Annual physical exam        Relevant Orders    Lipid panel    Comprehensive metabolic panel    TSH, 3rd generation with Free T4 reflex    CBC and differential          Immunizations and preventive care screenings were discussed with patient today  Appropriate education was printed on patient's after visit summary  Counseling:  Alcohol/drug use: discussed moderation in alcohol intake, the recommendations for healthy alcohol use, and avoidance of illicit drug use  Dental Health: discussed importance of regular tooth brushing, flossing, and dental visits  Injury prevention: discussed safety/seat belts, safety helmets, smoke detectors, carbon dioxide detectors, and smoking near bedding or upholstery  Sexual health: discussed sexually transmitted diseases, partner selection, use of condoms, avoidance of unintended pregnancy, and contraceptive alternatives  · Exercise: the importance of regular exercise/physical activity was discussed   Recommend exercise 3-5 times per week for at least 30 minutes  Return in about 2 weeks (around 9/28/2020) for insomnia, blood work follow up   Chief Complaint:     Chief Complaint   Patient presents with    Physical Exam     Patient present today for his Annual Physical exam  Patient will like to get blood work done to check his Kidneys   Dizziness     Patient complaints of dizziness and blurry vision in the AM       History of Present Illness:     Adult Annual Physical   Patient here for a comprehensive physical exam  The patient reports problems - occasinal dizziness  Diet and Physical Activity  · Diet/Nutrition: well balanced diet  · Exercise: walking, 5-7 times a week on average and 1-2 hours on average  Depression Screening  PHQ-9 Depression Screening    PHQ-9:    Frequency of the following problems over the past two weeks:       Little interest or pleasure in doing things:  0 - not at all  Feeling down, depressed, or hopeless:  0 - not at all  PHQ-2 Score:  0       General Health  · Sleep: sleeps poorly and gets 4-6 hours of sleep on average  · Hearing: normal - bilateral   · Vision: no vision problems  · Dental: regular dental visits and brushes teeth twice daily   Health  · History of STDs?: no      Review of Systems:     Review of Systems   Constitutional: Negative  HENT: Negative  Respiratory: Negative  Cardiovascular: Negative  Gastrointestinal: Positive for abdominal pain, constipation and diarrhea  Negative for nausea and vomiting  Genitourinary: Negative  Ejaculation and erection concerns   Neurological: Positive for light-headedness  Psychiatric/Behavioral: Positive for sleep disturbance  The patient is nervous/anxious  Past Medical History:     Past Medical History:   Diagnosis Date    Dizziness     hx head injury when child-gets dizzy and headaches      Past Surgical History:     History reviewed  No pertinent surgical history     Social History:        Social History     Socioeconomic History    Marital status: /Civil Union     Spouse name: None    Number of children: None    Years of education: None    Highest education level: None   Occupational History    None   Social Needs    Financial resource strain: None    Food insecurity     Worry: None     Inability: None    Transportation needs     Medical: None     Non-medical: None   Tobacco Use    Smoking status: Never Smoker    Smokeless tobacco: Never Used   Substance and Sexual Activity    Alcohol use: Yes     Comment: social    Drug use: No    Sexual activity: Yes     Partners: Female   Lifestyle    Physical activity     Days per week: None     Minutes per session: None    Stress: None   Relationships    Social connections     Talks on phone: None     Gets together: None     Attends Bahai service: None     Active member of club or organization: None     Attends meetings of clubs or organizations: None     Relationship status: None    Intimate partner violence     Fear of current or ex partner: None     Emotionally abused: None     Physically abused: None     Forced sexual activity: None   Other Topics Concern    None   Social History Narrative    Lives with wife and daughter    Full time  Employment    apartment      Family History:     Family History   Problem Relation Age of Onset    No Known Problems Mother     Hypertension Father     Hypertension Maternal Grandmother     Vision loss Maternal Grandfather     No Known Problems Paternal Grandmother     No Known Problems Paternal Grandfather       Current Medications:     Current Outpatient Medications   Medication Sig Dispense Refill    Cholecalciferol (VITAMIN D3) 2000 units capsule Take 1 capsule (2,000 Units total) by mouth daily 90 capsule 0    ergocalciferol (VITAMIN D2) 50,000 units Take 1 capsule (50,000 Units total) by mouth once a week (Patient not taking: Reported on 9/14/2020) 8 capsule 0    linaCLOtide 72 MCG CAPS Take 1 caplet by mouth daily as needed (constipation) 30 capsule 1    pantoprazole (PROTONIX) 40 mg tablet Take 40 mg by mouth      traZODone (DESYREL) 50 mg tablet Take 1 tablet (50 mg total) by mouth daily at bedtime as needed for sleep 30 tablet 1     No current facility-administered medications for this visit  Allergies:     No Known Allergies   Physical Exam:     /62 (BP Location: Left arm, Patient Position: Sitting, Cuff Size: Standard)   Pulse 56   Temp 97 6 °F (36 4 °C) (Temporal)   Resp 16   Ht 5' 6" (1 676 m)   Wt 67 1 kg (148 lb)   BMI 23 89 kg/m²     Physical Exam  Vitals signs and nursing note reviewed  Constitutional:       General: He is not in acute distress  Appearance: He is well-developed  He is not diaphoretic  HENT:      Head: Normocephalic and atraumatic  Right Ear: Tympanic membrane and external ear normal       Left Ear: External ear normal  There is impacted cerumen  Nose: Nose normal       Right Turbinates: Enlarged and swollen  Left Turbinates: Enlarged and swollen  Mouth/Throat:      Pharynx: Uvula midline  No oropharyngeal exudate  Eyes:      General: No scleral icterus  Conjunctiva/sclera: Conjunctivae normal       Pupils: Pupils are equal, round, and reactive to light  Neck:      Musculoskeletal: Normal range of motion  Thyroid: No thyromegaly  Vascular: No JVD  Cardiovascular:      Rate and Rhythm: Normal rate and regular rhythm  Heart sounds: Normal heart sounds  Pulmonary:      Effort: Pulmonary effort is normal  No respiratory distress  Breath sounds: Normal breath sounds  Abdominal:      General: Bowel sounds are normal  There is no distension  Palpations: Abdomen is soft  Tenderness: There is no abdominal tenderness  Musculoskeletal: Normal range of motion  Lymphadenopathy:      Cervical: No cervical adenopathy  Skin:     General: Skin is warm and dry  Capillary Refill: Capillary refill takes less than 2 seconds  Neurological:      Mental Status: He is alert and oriented to person, place, and time  Coordination: Coordination normal       Gait: Gait normal       Deep Tendon Reflexes:      Reflex Scores:       Tricep reflexes are 2+ on the right side and 2+ on the left side  Bicep reflexes are 2+ on the right side and 2+ on the left side  Patellar reflexes are 2+ on the right side and 2+ on the left side  Psychiatric:         Thought Content:  Thought content normal           GIULIANO Plata   [facility] PRIMARY [facility]ARE

## 2020-09-16 ENCOUNTER — APPOINTMENT (OUTPATIENT)
Dept: LAB | Facility: HOSPITAL | Age: 35
End: 2020-09-16
Payer: COMMERCIAL

## 2020-09-16 DIAGNOSIS — E55.9 VITAMIN D DEFICIENCY: ICD-10-CM

## 2020-09-16 DIAGNOSIS — N52.9 ERECTILE DYSFUNCTION, UNSPECIFIED ERECTILE DYSFUNCTION TYPE: ICD-10-CM

## 2020-09-16 DIAGNOSIS — Z00.00 ANNUAL PHYSICAL EXAM: ICD-10-CM

## 2020-09-16 LAB
25(OH)D3 SERPL-MCNC: 25.6 NG/ML (ref 30–100)
ALBUMIN SERPL BCP-MCNC: 3.7 G/DL (ref 3.5–5)
ALP SERPL-CCNC: 34 U/L (ref 46–116)
ALT SERPL W P-5'-P-CCNC: 30 U/L (ref 12–78)
ANION GAP SERPL CALCULATED.3IONS-SCNC: 5 MMOL/L (ref 4–13)
AST SERPL W P-5'-P-CCNC: 22 U/L (ref 5–45)
BASOPHILS # BLD AUTO: 0.02 THOUSANDS/ΜL (ref 0–0.1)
BASOPHILS NFR BLD AUTO: 0 % (ref 0–1)
BILIRUB SERPL-MCNC: 0.37 MG/DL (ref 0.2–1)
BUN SERPL-MCNC: 18 MG/DL (ref 5–25)
CALCIUM SERPL-MCNC: 8.9 MG/DL (ref 8.3–10.1)
CHLORIDE SERPL-SCNC: 103 MMOL/L (ref 100–108)
CHOLEST SERPL-MCNC: 170 MG/DL (ref 50–200)
CO2 SERPL-SCNC: 30 MMOL/L (ref 21–32)
CREAT SERPL-MCNC: 1.09 MG/DL (ref 0.6–1.3)
EOSINOPHIL # BLD AUTO: 0.22 THOUSAND/ΜL (ref 0–0.61)
EOSINOPHIL NFR BLD AUTO: 4 % (ref 0–6)
ERYTHROCYTE [DISTWIDTH] IN BLOOD BY AUTOMATED COUNT: 12.8 % (ref 11.6–15.1)
GFR SERPL CREATININE-BSD FRML MDRD: 89 ML/MIN/1.73SQ M
GLUCOSE P FAST SERPL-MCNC: 89 MG/DL (ref 65–99)
HCT VFR BLD AUTO: 45.9 % (ref 36.5–49.3)
HDLC SERPL-MCNC: 44 MG/DL
HGB BLD-MCNC: 14.3 G/DL (ref 12–17)
IMM GRANULOCYTES # BLD AUTO: 0.02 THOUSAND/UL (ref 0–0.2)
IMM GRANULOCYTES NFR BLD AUTO: 0 % (ref 0–2)
LDLC SERPL CALC-MCNC: 104 MG/DL (ref 0–100)
LYMPHOCYTES # BLD AUTO: 2.16 THOUSANDS/ΜL (ref 0.6–4.47)
LYMPHOCYTES NFR BLD AUTO: 36 % (ref 14–44)
MCH RBC QN AUTO: 28.4 PG (ref 26.8–34.3)
MCHC RBC AUTO-ENTMCNC: 31.2 G/DL (ref 31.4–37.4)
MCV RBC AUTO: 91 FL (ref 82–98)
MONOCYTES # BLD AUTO: 0.41 THOUSAND/ΜL (ref 0.17–1.22)
MONOCYTES NFR BLD AUTO: 7 % (ref 4–12)
NEUTROPHILS # BLD AUTO: 3.21 THOUSANDS/ΜL (ref 1.85–7.62)
NEUTS SEG NFR BLD AUTO: 53 % (ref 43–75)
NONHDLC SERPL-MCNC: 126 MG/DL
NRBC BLD AUTO-RTO: 0 /100 WBCS
PLATELET # BLD AUTO: 202 THOUSANDS/UL (ref 149–390)
PMV BLD AUTO: 10.7 FL (ref 8.9–12.7)
POTASSIUM SERPL-SCNC: 4.4 MMOL/L (ref 3.5–5.3)
PROT SERPL-MCNC: 7.6 G/DL (ref 6.4–8.2)
RBC # BLD AUTO: 5.04 MILLION/UL (ref 3.88–5.62)
SODIUM SERPL-SCNC: 138 MMOL/L (ref 136–145)
TRIGL SERPL-MCNC: 110 MG/DL
TSH SERPL DL<=0.05 MIU/L-ACNC: 1.5 UIU/ML (ref 0.36–3.74)
WBC # BLD AUTO: 6.04 THOUSAND/UL (ref 4.31–10.16)

## 2020-09-16 PROCEDURE — 80053 COMPREHEN METABOLIC PANEL: CPT

## 2020-09-16 PROCEDURE — 36415 COLL VENOUS BLD VENIPUNCTURE: CPT

## 2020-09-16 PROCEDURE — 80061 LIPID PANEL: CPT | Performed by: NURSE PRACTITIONER

## 2020-09-16 PROCEDURE — 82306 VITAMIN D 25 HYDROXY: CPT

## 2020-09-16 PROCEDURE — 84402 ASSAY OF FREE TESTOSTERONE: CPT

## 2020-09-16 PROCEDURE — 84443 ASSAY THYROID STIM HORMONE: CPT

## 2020-09-16 PROCEDURE — 85025 COMPLETE CBC W/AUTO DIFF WBC: CPT

## 2020-09-16 PROCEDURE — 84403 ASSAY OF TOTAL TESTOSTERONE: CPT

## 2020-09-17 LAB
TESTOST FREE SERPL-MCNC: 16 PG/ML (ref 8.7–25.1)
TESTOST SERPL-MCNC: 565 NG/DL (ref 264–916)

## 2020-09-21 ENCOUNTER — TELEPHONE (OUTPATIENT)
Dept: FAMILY MEDICINE CLINIC | Facility: CLINIC | Age: 35
End: 2020-09-21

## 2020-09-21 NOTE — TELEPHONE ENCOUNTER
PT CALLED IN TO REQUEST HIS RESULTS FOR HIS BW  PLEASE GIVE PT A CALL WITH BW RESULTS     ORIANA PT SPEAKS Indonesian

## 2020-10-01 ENCOUNTER — OFFICE VISIT (OUTPATIENT)
Dept: FAMILY MEDICINE CLINIC | Facility: CLINIC | Age: 35
End: 2020-10-01
Payer: COMMERCIAL

## 2020-10-01 VITALS
TEMPERATURE: 98.2 F | HEIGHT: 66 IN | BODY MASS INDEX: 23.98 KG/M2 | DIASTOLIC BLOOD PRESSURE: 62 MMHG | WEIGHT: 149.2 LBS | SYSTOLIC BLOOD PRESSURE: 102 MMHG | HEART RATE: 68 BPM | RESPIRATION RATE: 14 BRPM

## 2020-10-01 DIAGNOSIS — G47.00 INSOMNIA, UNSPECIFIED TYPE: Primary | ICD-10-CM

## 2020-10-01 DIAGNOSIS — N52.9 ERECTILE DYSFUNCTION, UNSPECIFIED ERECTILE DYSFUNCTION TYPE: ICD-10-CM

## 2020-10-01 DIAGNOSIS — E55.9 VITAMIN D DEFICIENCY: ICD-10-CM

## 2020-10-01 PROCEDURE — 99213 OFFICE O/P EST LOW 20 MIN: CPT | Performed by: NURSE PRACTITIONER

## 2020-10-07 DIAGNOSIS — G47.00 INSOMNIA, UNSPECIFIED TYPE: ICD-10-CM

## 2020-10-07 RX ORDER — TRAZODONE HYDROCHLORIDE 50 MG/1
50 TABLET ORAL
Qty: 30 TABLET | Refills: 1 | OUTPATIENT
Start: 2020-10-07

## 2020-11-11 ENCOUNTER — OFFICE VISIT (OUTPATIENT)
Dept: FAMILY MEDICINE CLINIC | Facility: CLINIC | Age: 35
End: 2020-11-11
Payer: COMMERCIAL

## 2020-11-11 VITALS
HEIGHT: 66 IN | BODY MASS INDEX: 24.49 KG/M2 | TEMPERATURE: 98.2 F | DIASTOLIC BLOOD PRESSURE: 72 MMHG | HEART RATE: 64 BPM | WEIGHT: 152.4 LBS | SYSTOLIC BLOOD PRESSURE: 118 MMHG

## 2020-11-11 DIAGNOSIS — N52.9 ERECTILE DYSFUNCTION, UNSPECIFIED ERECTILE DYSFUNCTION TYPE: ICD-10-CM

## 2020-11-11 DIAGNOSIS — R31.29 MICROHEMATURIA: ICD-10-CM

## 2020-11-11 DIAGNOSIS — R10.30 LOWER ABDOMINAL PAIN: Primary | ICD-10-CM

## 2020-11-11 DIAGNOSIS — N41.0 ACUTE PROSTATITIS: ICD-10-CM

## 2020-11-11 LAB
SL AMB  POCT GLUCOSE, UA: ABNORMAL
SL AMB LEUKOCYTE ESTERASE,UA: ABNORMAL
SL AMB POCT BILIRUBIN,UA: ABNORMAL
SL AMB POCT BLOOD,UA: ABNORMAL
SL AMB POCT CLARITY,UA: CLEAR
SL AMB POCT COLOR,UA: YELLOW
SL AMB POCT KETONES,UA: ABNORMAL
SL AMB POCT NITRITE,UA: ABNORMAL
SL AMB POCT PH,UA: 5.5
SL AMB POCT SPECIFIC GRAVITY,UA: 1.03
SL AMB POCT URINE PROTEIN: ABNORMAL
SL AMB POCT UROBILINOGEN: 0.2

## 2020-11-11 PROCEDURE — 87086 URINE CULTURE/COLONY COUNT: CPT | Performed by: NURSE PRACTITIONER

## 2020-11-11 PROCEDURE — 81002 URINALYSIS NONAUTO W/O SCOPE: CPT | Performed by: NURSE PRACTITIONER

## 2020-11-11 PROCEDURE — 99213 OFFICE O/P EST LOW 20 MIN: CPT | Performed by: NURSE PRACTITIONER

## 2020-11-11 RX ORDER — DOXYCYCLINE HYCLATE 100 MG/1
100 CAPSULE ORAL EVERY 12 HOURS SCHEDULED
Qty: 20 CAPSULE | Refills: 0 | Status: SHIPPED | OUTPATIENT
Start: 2020-11-11 | End: 2020-11-17 | Stop reason: SDUPTHER

## 2020-11-12 LAB — BACTERIA UR CULT: NORMAL

## 2020-11-17 ENCOUNTER — OFFICE VISIT (OUTPATIENT)
Dept: UROLOGY | Facility: MEDICAL CENTER | Age: 35
End: 2020-11-17
Payer: COMMERCIAL

## 2020-11-17 VITALS
HEART RATE: 72 BPM | BODY MASS INDEX: 24.91 KG/M2 | TEMPERATURE: 97.5 F | WEIGHT: 155 LBS | HEIGHT: 66 IN | DIASTOLIC BLOOD PRESSURE: 62 MMHG | SYSTOLIC BLOOD PRESSURE: 104 MMHG

## 2020-11-17 DIAGNOSIS — R31.29 MICROHEMATURIA: ICD-10-CM

## 2020-11-17 DIAGNOSIS — N41.0 ACUTE PROSTATITIS: ICD-10-CM

## 2020-11-17 DIAGNOSIS — R10.30 LOWER ABDOMINAL PAIN: ICD-10-CM

## 2020-11-17 DIAGNOSIS — N52.9 ERECTILE DYSFUNCTION, UNSPECIFIED ERECTILE DYSFUNCTION TYPE: ICD-10-CM

## 2020-11-17 DIAGNOSIS — N41.1 CHRONIC PROSTATITIS: Primary | ICD-10-CM

## 2020-11-17 PROCEDURE — 99214 OFFICE O/P EST MOD 30 MIN: CPT | Performed by: UROLOGY

## 2020-11-17 RX ORDER — TADALAFIL 2.5 MG/1
2.5 TABLET ORAL DAILY PRN
Qty: 90 TABLET | Refills: 3 | Status: SHIPPED | OUTPATIENT
Start: 2020-11-17

## 2020-11-17 RX ORDER — DOXYCYCLINE HYCLATE 100 MG/1
100 CAPSULE ORAL EVERY 12 HOURS SCHEDULED
Qty: 40 CAPSULE | Refills: 0 | Status: SHIPPED | OUTPATIENT
Start: 2020-11-17 | End: 2020-11-27

## 2020-12-07 ENCOUNTER — HOSPITAL ENCOUNTER (OUTPATIENT)
Dept: CT IMAGING | Facility: HOSPITAL | Age: 35
Discharge: HOME | End: 2020-12-07
Attending: UROLOGY
Payer: COMMERCIAL

## 2020-12-07 DIAGNOSIS — R10.30 LOWER ABDOMINAL PAIN: ICD-10-CM

## 2020-12-07 PROCEDURE — G1004 CDSM NDSC: HCPCS

## 2020-12-07 PROCEDURE — 74178 CT ABD&PLV WO CNTR FLWD CNTR: CPT

## 2020-12-07 RX ADMIN — IOHEXOL 100 ML: 350 INJECTION, SOLUTION INTRAVENOUS at 09:48

## 2020-12-31 ENCOUNTER — TELEPHONE (OUTPATIENT)
Dept: FAMILY MEDICINE CLINIC | Facility: CLINIC | Age: 35
End: 2020-12-31

## 2020-12-31 DIAGNOSIS — K59.00 CONSTIPATION, UNSPECIFIED CONSTIPATION TYPE: ICD-10-CM

## 2021-01-07 ENCOUNTER — PROCEDURE VISIT (OUTPATIENT)
Dept: UROLOGY | Facility: MEDICAL CENTER | Age: 36
End: 2021-01-07
Payer: COMMERCIAL

## 2021-01-07 VITALS — BODY MASS INDEX: 24.91 KG/M2 | WEIGHT: 155 LBS | HEIGHT: 66 IN

## 2021-01-07 DIAGNOSIS — R10.30 LOWER ABDOMINAL PAIN: ICD-10-CM

## 2021-01-07 DIAGNOSIS — R31.29 MICROSCOPIC HEMATURIA: Primary | ICD-10-CM

## 2021-01-07 DIAGNOSIS — R68.82 DECREASED LIBIDO: ICD-10-CM

## 2021-01-07 DIAGNOSIS — N41.1 CHRONIC PROSTATITIS: ICD-10-CM

## 2021-01-07 LAB
SL AMB  POCT GLUCOSE, UA: NORMAL
SL AMB LEUKOCYTE ESTERASE,UA: NORMAL
SL AMB POCT BILIRUBIN,UA: NORMAL
SL AMB POCT BLOOD,UA: NORMAL
SL AMB POCT CLARITY,UA: CLEAR
SL AMB POCT COLOR,UA: YELLOW
SL AMB POCT KETONES,UA: NORMAL
SL AMB POCT NITRITE,UA: NORMAL
SL AMB POCT PH,UA: 7
SL AMB POCT SPECIFIC GRAVITY,UA: 1.02
SL AMB POCT URINE PROTEIN: NORMAL
SL AMB POCT UROBILINOGEN: 0.2

## 2021-01-07 PROCEDURE — 52000 CYSTOURETHROSCOPY: CPT | Performed by: UROLOGY

## 2021-01-07 PROCEDURE — 81003 URINALYSIS AUTO W/O SCOPE: CPT | Performed by: UROLOGY

## 2021-01-07 PROCEDURE — 99214 OFFICE O/P EST MOD 30 MIN: CPT | Performed by: UROLOGY

## 2021-01-07 RX ORDER — SULFAMETHOXAZOLE AND TRIMETHOPRIM 800; 160 MG/1; MG/1
1 TABLET ORAL EVERY 12 HOURS SCHEDULED
Qty: 4 TABLET | Refills: 0 | Status: SHIPPED | OUTPATIENT
Start: 2021-01-07 | End: 2021-01-09

## 2021-01-07 NOTE — LETTER
January 7, 2021     Vivi Marquis, [address]    Patient: Tejas Silva   YOB: 1985   Date of Visit: 1/7/2021       Dear Dr Thony Menezes:    Thank you for referring Tejas Silva to me for evaluation  Below are my notes for this consultation  If you have questions, please do not hesitate to call me  I look forward to following your patient along with you  Sincerely,        Flori Campos MD        CC: No Recipients  Floir Campos MD  1/7/2021  9:58 AM  Sign when Signing Visit  Assessment/Plan:   1  Microscopic hematuria -resolved- no abnormality to account for any hematuria on CT renal protocol and cystoscopy  2  Chronic prostatitis- doxycycline has not improved any symptomatology  3  Chronic lower abdominal pain -no etiology identified on CT -no  etiology  No problem-specific Assessment & Plan notes found for this encounter  Diagnoses and all orders for this visit:    Microscopic hematuria    Chronic prostatitis  -     POCT urine dip auto non-scope  -     sulfamethoxazole-trimethoprim (BACTRIM DS) 800-160 mg per tablet; Take 1 tablet by mouth every 12 (twelve) hours for 2 days    Lower abdominal pain          Subjective:      Patient ID: Tejas Silva is a 37 y o  male  HPI  82-year-old male previously seen by Dr Abigail Matias with intermittent symptoms of lower abdominal discomfort with voiding some urinary frequency previously treated with doxycycline as well as Bactrim DS currently on doxycycline  Patient also complains of episodic erectile dysfunction which he notes is getting worse noting state decreased erectile rigidity early loss of erection and occasional difficulty obtaining an erection capable of penetration  He denies any gross hematuria or dysuria regularly with voiding  The patient presents for evaluation  Interview conducted through     The following portions of the patient's history were reviewed and updated as appropriate: allergies, current medications, past family history, past medical history, past social history, past surgical history and problem list     Review of Systems   Gastrointestinal: Positive for abdominal pain  Genitourinary: Positive for frequency  Objective:      Ht 5' 6" (1 676 m)   Wt 70 3 kg (155 lb)   BMI 25 02 kg/m²          Physical Exam  Vitals signs reviewed  Constitutional:       General: He is not in acute distress  Appearance: Normal appearance  He is normal weight  He is not ill-appearing, toxic-appearing or diaphoretic  HENT:      Head: Normocephalic and atraumatic  Eyes:      Extraocular Movements: Extraocular movements intact  Neck:      Musculoskeletal: Neck supple  Pulmonary:      Effort: Pulmonary effort is normal  No respiratory distress  Abdominal:      Palpations: Abdomen is soft  Genitourinary:     Penis: Normal     Skin:     General: Skin is dry  Neurological:      Mental Status: He is alert and oriented to person, place, and time  Psychiatric:         Mood and Affect: Mood normal          Behavior: Behavior normal          Thought Content:  Thought content normal          Judgment: Judgment normal

## 2021-01-07 NOTE — PROGRESS NOTES
Assessment/Plan:   1  Microscopic hematuria -resolved- no abnormality to account for any hematuria on CT renal protocol and cystoscopy  2  Chronic prostatitis- doxycycline has not improved any symptomatology  3  Chronic lower abdominal pain -no etiology identified on CT -no  etiology  4 decreased libido- check testosterone panel  No problem-specific Assessment & Plan notes found for this encounter  Diagnoses and all orders for this visit:    Microscopic hematuria    Chronic prostatitis  -     POCT urine dip auto non-scope  -     sulfamethoxazole-trimethoprim (BACTRIM DS) 800-160 mg per tablet; Take 1 tablet by mouth every 12 (twelve) hours for 2 days    Lower abdominal pain          Subjective:      Patient ID: Derrick Jama is a 37 y o  male  HPI  79-year-old male previously seen by Dr Jannette Berg with intermittent symptoms of lower abdominal discomfort with voiding some urinary frequency previously treated with doxycycline as well as Bactrim DS currently on doxycycline  Patient also complains of episodic erectile dysfunction which he notes is getting worse noting state decreased erectile rigidity early loss of erection and occasional difficulty obtaining an erection capable of penetration  He denies any gross hematuria or dysuria regularly with voiding  After discussing the above findings on CT blood work urine testing and cystoscopy with the patient via  patient expressed a recent decrease in libido  Testosterone will be obtained this is normal patient will be referred back to his family physician for further counseling  The patient presents for evaluation  Interview conducted through     The following portions of the patient's history were reviewed and updated as appropriate: allergies, current medications, past family history, past medical history, past social history, past surgical history and problem list     Review of Systems   Gastrointestinal: Positive for abdominal pain  Genitourinary: Positive for frequency  Objective:      Ht 5' 6" (1 676 m)   Wt 70 3 kg (155 lb)   BMI 25 02 kg/m²          Physical Exam  Vitals signs reviewed  Constitutional:       General: He is not in acute distress  Appearance: Normal appearance  He is normal weight  He is not ill-appearing, toxic-appearing or diaphoretic  HENT:      Head: Normocephalic and atraumatic  Eyes:      Extraocular Movements: Extraocular movements intact  Neck:      Musculoskeletal: Neck supple  Pulmonary:      Effort: Pulmonary effort is normal  No respiratory distress  Abdominal:      Palpations: Abdomen is soft  Genitourinary:     Penis: Normal     Skin:     General: Skin is dry  Neurological:      Mental Status: He is alert and oriented to person, place, and time  Psychiatric:         Mood and Affect: Mood normal          Behavior: Behavior normal          Thought Content: Thought content normal          Judgment: Judgment normal           Cystoscopy     Date/Time 1/7/2021 9:59 AM     Performed by  Lani Pemberton MD     Authorized by Lani Pemberton MD      Universal Protocol:  Consent: Verbal consent obtained  Risks and benefits: risks, benefits and alternatives were discussed  Consent given by: patient  Patient understanding: patient states understanding of the procedure being performed  Patient consent: the patient's understanding of the procedure matches consent given  Procedure consent: procedure consent matches procedure scheduled  Required items: required blood products, implants, devices, and special equipment available  Patient identity confirmed: verbally with patient        Procedure Details:  Procedure type: cystoscopy    Patient tolerance: Patient tolerated the procedure well with no immediate complications    Additional Procedure Details:    The patient was placed supine on the examining table in the urethra was prepped with Betadine and 2% lidocaine lubricant instilled and left for 5 minutes  Flexible video cystourethroscopy took place revealing a normal lower urinary tract with normal urethra without stricture lesion  The prostatic urethra revealed no evidence of prostatic enlargement or bladder outlet obstruction  Urinary bladder was free of any intrinsic lesions or extrinsic mass compression was finally trabeculated and the ureteral orifices were normal position and configuration bilaterally with clear efflux  The cystoscope was then retroflexed and there was no additional information gathered with normal bladder outlet identified in that position  The cystoscope was removed atraumatically and the patient voided spontaneously  He was discharged from the office in good condition and he tolerated the procedure well  No  abnormality has been identified

## 2021-03-12 ENCOUNTER — APPOINTMENT (OUTPATIENT)
Dept: LAB | Facility: HOSPITAL | Age: 36
End: 2021-03-12
Attending: UROLOGY
Payer: COMMERCIAL

## 2021-03-12 PROCEDURE — 36415 COLL VENOUS BLD VENIPUNCTURE: CPT | Performed by: UROLOGY

## 2021-03-12 PROCEDURE — 84402 ASSAY OF FREE TESTOSTERONE: CPT | Performed by: UROLOGY

## 2021-03-12 PROCEDURE — 84403 ASSAY OF TOTAL TESTOSTERONE: CPT | Performed by: UROLOGY

## 2021-03-13 LAB
TESTOST FREE SERPL-MCNC: 7.2 PG/ML (ref 8.7–25.1)
TESTOST SERPL-MCNC: 314 NG/DL (ref 264–916)

## 2021-03-31 ENCOUNTER — OFFICE VISIT (OUTPATIENT)
Dept: UROLOGY | Facility: MEDICAL CENTER | Age: 36
End: 2021-03-31
Payer: COMMERCIAL

## 2021-03-31 VITALS
BODY MASS INDEX: 24.91 KG/M2 | HEART RATE: 58 BPM | SYSTOLIC BLOOD PRESSURE: 100 MMHG | DIASTOLIC BLOOD PRESSURE: 60 MMHG | WEIGHT: 155 LBS | HEIGHT: 66 IN

## 2021-03-31 DIAGNOSIS — R68.82 LOW LIBIDO: Primary | ICD-10-CM

## 2021-03-31 DIAGNOSIS — N52.9 ERECTILE DYSFUNCTION, UNSPECIFIED ERECTILE DYSFUNCTION TYPE: ICD-10-CM

## 2021-03-31 DIAGNOSIS — E29.1 HYPOGONADISM IN MALE: ICD-10-CM

## 2021-03-31 LAB
SL AMB  POCT GLUCOSE, UA: NORMAL
SL AMB LEUKOCYTE ESTERASE,UA: NORMAL
SL AMB POCT BILIRUBIN,UA: NORMAL
SL AMB POCT BLOOD,UA: NORMAL
SL AMB POCT CLARITY,UA: CLEAR
SL AMB POCT COLOR,UA: YELLOW
SL AMB POCT KETONES,UA: NORMAL
SL AMB POCT NITRITE,UA: NORMAL
SL AMB POCT PH,UA: 8
SL AMB POCT SPECIFIC GRAVITY,UA: 1.02
SL AMB POCT URINE PROTEIN: NORMAL
SL AMB POCT UROBILINOGEN: 0.2

## 2021-03-31 PROCEDURE — 99214 OFFICE O/P EST MOD 30 MIN: CPT | Performed by: UROLOGY

## 2021-03-31 PROCEDURE — 81003 URINALYSIS AUTO W/O SCOPE: CPT | Performed by: UROLOGY

## 2021-03-31 NOTE — PROGRESS NOTES
Assessment/Plan:  1  Low libido -patient had normal testosterone panel in the past but now has a lower total testosterone within normal limits however  His free testosterone is below normal   Inasmuch as he wants to maintain fertility Clomid will be prescribed to see if there is any symptomatic change in libido or erectile response with higher testosterone level  2  Erectile dysfunction -patient complains of early loss of erection but does not wish to take Cialis on a daily basis at this time  Prolactin level will be obtained because of his sub normal free fraction  Eventual PDE 5 inhibitors may be necessary if higher testosterone levels do not improve his situation  3  Hypogonadism -minimal borderline  No problem-specific Assessment & Plan notes found for this encounter  Diagnoses and all orders for this visit:    Low libido  -     POCT urine dip auto non-scope  -     clomiPHENE (CLOMID) 50 mg tablet; Take 1 tablet (50 mg total) by mouth daily  -     Prolactin; Future  -     Testosterone, free, total; Future    Erectile dysfunction, unspecified erectile dysfunction type    Hypogonadism in male  -     clomiPHENE (CLOMID) 50 mg tablet; Take 1 tablet (50 mg total) by mouth daily  -     Prolactin; Future  -     Testosterone, free, total; Future          Subjective:      Patient ID: Yaya Thomas is a 37 y o  male  HPI    47-year-old male with complaints of low libido and early loss of erection presents to discuss testosterone lab work which  Shows a sub normal free fraction and a normal total testosterone  He complains of early loss of erection and some slight loss of libido  He feels as if someone his age should not have to take medication for erectile problems    The following portions of the patient's history were reviewed and updated as appropriate: allergies, current medications, past family history, past medical history, past social history, past surgical history and problem list     Review of Systems   All other systems reviewed and are negative  Objective:      /60   Pulse 58   Ht 5' 6" (1 676 m)   Wt 70 3 kg (155 lb)   BMI 25 02 kg/m²          Physical Exam  Vitals signs reviewed  Constitutional:       General: He is not in acute distress  Appearance: Normal appearance  He is normal weight  He is not ill-appearing, toxic-appearing or diaphoretic  HENT:      Head: Normocephalic and atraumatic  Nose: Nose normal       Mouth/Throat:      Mouth: Mucous membranes are moist    Eyes:      Extraocular Movements: Extraocular movements intact  Neck:      Musculoskeletal: Normal range of motion  Pulmonary:      Effort: Pulmonary effort is normal  No respiratory distress  Abdominal:      Palpations: Abdomen is soft  Musculoskeletal: Normal range of motion  Skin:     General: Skin is warm and dry  Neurological:      General: No focal deficit present  Mental Status: He is alert and oriented to person, place, and time  Mental status is at baseline  Psychiatric:         Mood and Affect: Mood normal          Behavior: Behavior normal          Thought Content:  Thought content normal          Judgment: Judgment normal

## 2021-04-02 ENCOUNTER — APPOINTMENT (OUTPATIENT)
Dept: LAB | Facility: HOSPITAL | Age: 36
End: 2021-04-02
Attending: UROLOGY
Payer: COMMERCIAL

## 2021-04-02 DIAGNOSIS — R68.82 LOW LIBIDO: ICD-10-CM

## 2021-04-02 DIAGNOSIS — E29.1 HYPOGONADISM IN MALE: ICD-10-CM

## 2021-04-02 LAB — PROLACTIN SERPL-MCNC: 9.8 NG/ML (ref 2.5–17.4)

## 2021-04-02 PROCEDURE — 84146 ASSAY OF PROLACTIN: CPT

## 2021-04-02 PROCEDURE — 84403 ASSAY OF TOTAL TESTOSTERONE: CPT

## 2021-04-02 PROCEDURE — 36415 COLL VENOUS BLD VENIPUNCTURE: CPT

## 2021-04-02 PROCEDURE — 84402 ASSAY OF FREE TESTOSTERONE: CPT

## 2021-04-03 LAB
TESTOST FREE SERPL-MCNC: 11.9 PG/ML (ref 8.7–25.1)
TESTOST SERPL-MCNC: 450 NG/DL (ref 264–916)

## 2021-05-10 ENCOUNTER — OFFICE VISIT (OUTPATIENT)
Dept: FAMILY MEDICINE CLINIC | Facility: CLINIC | Age: 36
End: 2021-05-10
Payer: COMMERCIAL

## 2021-05-10 VITALS
SYSTOLIC BLOOD PRESSURE: 104 MMHG | WEIGHT: 154 LBS | BODY MASS INDEX: 24.75 KG/M2 | TEMPERATURE: 98.5 F | HEIGHT: 66 IN | DIASTOLIC BLOOD PRESSURE: 64 MMHG | HEART RATE: 62 BPM

## 2021-05-10 DIAGNOSIS — R30.9 PAINFUL URINATION: Primary | ICD-10-CM

## 2021-05-10 DIAGNOSIS — K59.00 CONSTIPATION, UNSPECIFIED CONSTIPATION TYPE: ICD-10-CM

## 2021-05-10 DIAGNOSIS — N48.1 BALANITIS: ICD-10-CM

## 2021-05-10 LAB
SL AMB  POCT GLUCOSE, UA: NORMAL
SL AMB LEUKOCYTE ESTERASE,UA: NORMAL
SL AMB POCT BILIRUBIN,UA: NORMAL
SL AMB POCT BLOOD,UA: NORMAL
SL AMB POCT CLARITY,UA: CLEAR
SL AMB POCT COLOR,UA: YELLOW
SL AMB POCT KETONES,UA: NORMAL
SL AMB POCT NITRITE,UA: NORMAL
SL AMB POCT PH,UA: 6
SL AMB POCT SPECIFIC GRAVITY,UA: 1.02
SL AMB POCT URINE PROTEIN: NORMAL
SL AMB POCT UROBILINOGEN: 1

## 2021-05-10 PROCEDURE — 81002 URINALYSIS NONAUTO W/O SCOPE: CPT | Performed by: NURSE PRACTITIONER

## 2021-05-10 PROCEDURE — 99214 OFFICE O/P EST MOD 30 MIN: CPT | Performed by: NURSE PRACTITIONER

## 2021-05-10 PROCEDURE — 87591 N.GONORRHOEAE DNA AMP PROB: CPT | Performed by: NURSE PRACTITIONER

## 2021-05-10 PROCEDURE — 87491 CHLMYD TRACH DNA AMP PROBE: CPT | Performed by: NURSE PRACTITIONER

## 2021-05-10 RX ORDER — CIPROFLOXACIN 500 MG/1
500 TABLET, FILM COATED ORAL EVERY 12 HOURS SCHEDULED
Qty: 14 TABLET | Refills: 0 | Status: SHIPPED | OUTPATIENT
Start: 2021-05-10 | End: 2021-05-17

## 2021-05-10 RX ORDER — CLOTRIMAZOLE AND BETAMETHASONE DIPROPIONATE 10; .64 MG/G; MG/G
CREAM TOPICAL 2 TIMES DAILY
Qty: 30 G | Refills: 0 | Status: SHIPPED | OUTPATIENT
Start: 2021-05-10 | End: 2021-06-01 | Stop reason: SDUPTHER

## 2021-05-10 NOTE — PATIENT INSTRUCTIONS
Problem List Items Addressed This Visit        Genitourinary    Balanitis     Lotrisone cream ordered to cover for balanitis  Relevant Medications    clotrimazole-betamethasone (LOTRISONE) 1-0 05 % cream       Other    Constipation    Relevant Medications    linaCLOtide 72 MCG CAPS    Painful urination - Primary     Patient reports dysuria but no issues with starting his stream or other symptoms of BPH  His symptoms are most consistent with STD  Ciprofloxacin ordered to cover for this  RPR, HSV, HIV, gonorrhea, and chlamydia ordered  Relevant Medications    ciprofloxacin (CIPRO) 500 mg tablet    Other Relevant Orders    POCT urine dip (Completed)    RPR    HIV 1/2 Antigen/Antibody (4th Generation) w Reflex [facility]    HSV TYPE 1,2 DNA PCR        COVID-19 Home Care Guidelines    Your healthcare provider and/or public health staff have evaluated you and have determined that you do not need to remain in the hospital at this time  At this time you can be isolated at home where you will be monitored by staff from your local or state health department  You should carefully follow the prevention and isolation steps below until a healthcare provider or local or state health department says that you can return to your normal activities  Stay home except to get medical care    People who are mildly ill with COVID-19 are able to isolate at home during their illness  You should restrict activities outside your home, except for getting medical care  Do not go to work, school, or public areas  Avoid using public transportation, ride-sharing, or taxis  Separate yourself from other people and animals in your home    People: As much as possible, you should stay in a specific room and away from other people in your home  Also, you should use a separate bathroom, if available  Animals:  You should restrict contact with pets and other animals while you are sick with COVID-19, just like you would around other people  Although there have not been reports of pets or other animals becoming sick with COVID-19, it is still recommended that people sick with COVID-19 limit contact with animals until more information is known about the virus  When possible, have another member of your household care for your animals while you are sick  If you are sick with COVID-19, avoid contact with your pet, including petting, snuggling, being kissed or licked, and sharing food  If you must care for your pet or be around animals while you are sick, wash your hands before and after you interact with pets and wear a facemask  See COVID-19 and Animals for more information  Call ahead before visiting your doctor    If you have a medical appointment, call the healthcare provider and tell them that you have or may have COVID-19  This will help the healthcare providers office take steps to keep other people from getting infected or exposed  Wear a facemask    You should wear a facemask when you are around other people (e g , sharing a room or vehicle) or pets and before you enter a healthcare providers office  If you are not able to wear a facemask (for example, because it causes trouble breathing), then people who live with you should not stay in the same room with you, or they should wear a facemask if they enter your room  Cover your coughs and sneezes    Cover your mouth and nose with a tissue when you cough or sneeze  Throw used tissues in a lined trash can  Immediately wash your hands with soap and water for at least 20 seconds or, if soap and water are not available, clean your hands with an alcohol-based hand  that contains at least 60% alcohol  Clean your hands often    Wash your hands often with soap and water for at least 20 seconds, especially after blowing your nose, coughing, or sneezing; going to the bathroom; and before eating or preparing food   If soap and water are not readily available, use an alcohol-based hand  with at least 60% alcohol, covering all surfaces of your hands and rubbing them together until they feel dry  Soap and water are the best option if hands are visibly dirty  Avoid touching your eyes, nose, and mouth with unwashed hands  Avoid sharing personal household items    You should not share dishes, drinking glasses, cups, eating utensils, towels, or bedding with other people or pets in your home  After using these items, they should be washed thoroughly with soap and water  Clean all high-touch surfaces everyday    High touch surfaces include counters, tabletops, doorknobs, bathroom fixtures, toilets, phones, keyboards, tablets, and bedside tables  Also, clean any surfaces that may have blood, stool, or body fluids on them  Use a household cleaning spray or wipe, according to the label instructions  Labels contain instructions for safe and effective use of the cleaning product including precautions you should take when applying the product, such as wearing gloves and making sure you have good ventilation during use of the product  Monitor your symptoms    Seek prompt medical attention if your illness is worsening (e g , difficulty breathing)  Before seeking care, call your healthcare provider and tell them that you have, or are being evaluated for, COVID-19  Put on a facemask before you enter the facility  These steps will help the healthcare providers office to keep other people in the office or waiting room from getting infected or exposed  Ask your healthcare provider to call the local or [facility] health department  Persons who are placed under active monitoring or facilitated self-monitoring should follow instructions provided by their local health department or occupational health professionals, as appropriate  If you have a medical emergency and need to call 911, notify the dispatch personnel that you have, or are being evaluated for COVID-19   If possible, put on a facemask before emergency medical services arrive  Discontinuing home isolation    Patients with confirmed COVID-19 should remain under home isolation precautions until the following conditions are met:   - They have had no fever for at least 24 hours (that is one full day of no fever without the use medicine that reduces fevers)  AND  - other symptoms have improved (for example, when their cough or shortness of breath have improved)  AND  - If had mild or moderate illness, at least 10 days have passed since their symptoms first appeared or if severe illness (needed oxygen) or immunosuppressed, at least 20 days have passed since symptoms first appeared  Patients with confirmed COVID-19 should also notify close contacts (including their workplace) and ask that they self-quarantine  Currently, close contact is defined as being within 6 feet for 15 minutes or more from the period 24 hours starting 48 hours before symptom onset to the time at which the patient went into isolation  Close contacts of patients diagnosed with COVID-19 should be instructed by the patient to self-quarantine for 14 days from the last time of their last contact with the patient       Source: [employer]

## 2021-05-10 NOTE — ASSESSMENT & PLAN NOTE
Patient reports dysuria but no issues with starting his stream or other symptoms of BPH  His symptoms are most consistent with STD  Ciprofloxacin ordered to cover for this  RPR, HSV, HIV, gonorrhea, and chlamydia ordered

## 2021-05-10 NOTE — PROGRESS NOTES
Assessment and Plan:    Problem List Items Addressed This Visit        Genitourinary    Balanitis     Lotrisone cream ordered to cover for balanitis  Relevant Medications    clotrimazole-betamethasone (LOTRISONE) 1-0 05 % cream       Other    Constipation    Relevant Medications    linaCLOtide 72 MCG CAPS    Painful urination - Primary     Patient reports dysuria but no issues with starting his stream or other symptoms of BPH  His symptoms are most consistent with STD  Ciprofloxacin ordered to cover for this  RPR, HSV, HIV, gonorrhea, and chlamydia ordered  Relevant Medications    ciprofloxacin (CIPRO) 500 mg tablet    Other Relevant Orders    POCT urine dip (Completed)    RPR    HIV 1/2 Antigen/Antibody (4th Generation) w Reflex [facility]    HSV TYPE 1,2 DNA PCR                 Diagnoses and all orders for this visit:    Painful urination  -     POCT urine dip  -     Cancel: POCT urine dip  -     Cancel: Urine culture  -     ciprofloxacin (CIPRO) 500 mg tablet; Take 1 tablet (500 mg total) by mouth every 12 (twelve) hours for 7 days  -     RPR; Future  -     HIV 1/2 Antigen/Antibody (4th Generation) w Reflex [facility]; Future  -     HSV TYPE 1,2 DNA PCR; Future    Constipation, unspecified constipation type  -     linaCLOtide 72 MCG CAPS; Take 1 caplet by mouth daily as needed (constipation)    Balanitis  -     clotrimazole-betamethasone (LOTRISONE) 1-0 05 % cream; Apply topically 2 (two) times a day              Subjective:      Patient ID: Ketty Russo is a 37 y o  male  CC:    Chief Complaint   Patient presents with    Difficulty Urinating     Painful urination for the past 5 days  Denies abdominal pain  - lsh       HPI:    Dysuria: Patient reports he has been having painful urination over the past 5 days  He states that it feels painful at the tip of his urethra when voiding  He states he has no issues starting his stream  He denies dribbling or nocturia   He states he was sexually active last about a week ago and this was not with a new partner  Urine dip in the office was negative for blood, nitrites, or leukocytes  The patient was seen for similar symptoms in November 2020 and was diagnosed with prostatitis that time  He was started on doxycycline and referred to Urology  He was seen by Urology in March of 2021  He was started on Clomid for decreased libido and erectile dysfunction  He was also prescribed Cialis p r n  for erectile dysfunction  It does not appear that his dysuria symptoms were discussed at this appointment  It also does not appear that the patient has ever had a PSA level checked  The following portions of the patient's history were reviewed and updated as appropriate: allergies, current medications, past family history, past medical history, past social history, past surgical history and problem list       Review of Systems   Constitutional: Negative for chills and fever  HENT: Negative for ear pain and sore throat  Eyes: Negative for pain and visual disturbance  Respiratory: Negative for cough, chest tightness, shortness of breath and wheezing  Cardiovascular: Negative for chest pain, palpitations and leg swelling  Gastrointestinal: Negative for abdominal pain, constipation, diarrhea, nausea and vomiting  Endocrine: Negative for cold intolerance, heat intolerance, polydipsia, polyphagia and polyuria  Genitourinary: Positive for dysuria and penile pain  Negative for decreased urine volume, difficulty urinating, discharge, enuresis, flank pain, frequency, genital sores, hematuria, penile swelling, scrotal swelling, testicular pain and urgency  Musculoskeletal: Negative for arthralgias, back pain and myalgias  Skin: Negative for color change and rash  Allergic/Immunologic: Negative for environmental allergies  Neurological: Negative for dizziness, seizures, syncope, weakness, light-headedness, numbness and headaches     Hematological: Negative for adenopathy  Psychiatric/Behavioral: Negative for confusion  The patient is not nervous/anxious  All other systems reviewed and are negative  Data to review:       Objective:    Vitals:    05/10/21 1827   BP: 104/64   BP Location: Left arm   Patient Position: Sitting   Cuff Size: Large   Pulse: 62   Temp: 98 5 °F (36 9 °C)   TempSrc: Oral   Weight: 69 9 kg (154 lb)   Height: 5' 6" (1 676 m)        Physical Exam  Vitals signs and nursing note reviewed  Constitutional:       General: He is not in acute distress  Appearance: Normal appearance  He is well-developed  He is not ill-appearing  HENT:      Head: Normocephalic and atraumatic  Eyes:      Conjunctiva/sclera: Conjunctivae normal    Neck:      Musculoskeletal: Normal range of motion and neck supple  Cardiovascular:      Rate and Rhythm: Normal rate and regular rhythm  Pulses: Normal pulses  Carotid pulses are 2+ on the right side and 2+ on the left side  Posterior tibial pulses are 2+ on the right side and 2+ on the left side  Heart sounds: Normal heart sounds  No murmur  Pulmonary:      Effort: Pulmonary effort is normal  No respiratory distress  Breath sounds: Normal breath sounds  No wheezing or rhonchi  Abdominal:      General: Abdomen is flat  Bowel sounds are normal  There is no distension  Palpations: Abdomen is soft  Tenderness: There is no abdominal tenderness  There is no guarding  Genitourinary:     Penis: Uncircumcised  Erythema and tenderness present  Scrotum/Testes: Normal           Comments: Erythema and redness noted around the whole tip of the penis  Tenderness was noted upon palpation  No chancres or other sores were noted but area appeared to be extremely irritated and dry  Musculoskeletal: Normal range of motion  Right lower leg: No edema  Left lower leg: No edema  Skin:     General: Skin is warm and dry        Capillary Refill: Capillary refill takes less than 2 seconds  Neurological:      General: No focal deficit present  Mental Status: He is alert and oriented to person, place, and time  Psychiatric:         Mood and Affect: Mood normal          Behavior: Behavior normal          Thought Content:  Thought content normal          Judgment: Judgment normal

## 2021-05-11 ENCOUNTER — APPOINTMENT (OUTPATIENT)
Dept: LAB | Facility: HOSPITAL | Age: 36
End: 2021-05-11
Payer: COMMERCIAL

## 2021-05-11 DIAGNOSIS — R30.9 PAINFUL URINATION: ICD-10-CM

## 2021-05-11 LAB
C TRACH DNA SPEC QL NAA+PROBE: NEGATIVE
HIV 1+2 AB+HIV1 P24 AG SERPL QL IA: NORMAL
N GONORRHOEA DNA SPEC QL NAA+PROBE: NEGATIVE
RPR SER QL: NORMAL

## 2021-05-11 PROCEDURE — 36415 COLL VENOUS BLD VENIPUNCTURE: CPT

## 2021-05-11 PROCEDURE — 86592 SYPHILIS TEST NON-TREP QUAL: CPT

## 2021-05-11 PROCEDURE — 87529 HSV DNA AMP PROBE: CPT

## 2021-05-11 PROCEDURE — 87389 HIV-1 AG W/HIV-1&-2 AB AG IA: CPT

## 2021-05-14 LAB
HSV1 DNA SPEC QL NAA+PROBE: NEGATIVE
HSV2 DNA SPEC QL NAA+PROBE: NEGATIVE

## 2021-06-01 ENCOUNTER — OFFICE VISIT (OUTPATIENT)
Dept: FAMILY MEDICINE CLINIC | Facility: CLINIC | Age: 36
End: 2021-06-01
Payer: COMMERCIAL

## 2021-06-01 VITALS
RESPIRATION RATE: 18 BRPM | SYSTOLIC BLOOD PRESSURE: 120 MMHG | HEIGHT: 66 IN | DIASTOLIC BLOOD PRESSURE: 70 MMHG | HEART RATE: 54 BPM | WEIGHT: 157 LBS | BODY MASS INDEX: 25.23 KG/M2

## 2021-06-01 DIAGNOSIS — N48.9 PENILE LESION: Primary | ICD-10-CM

## 2021-06-01 DIAGNOSIS — N48.1 BALANITIS: ICD-10-CM

## 2021-06-01 PROCEDURE — 99214 OFFICE O/P EST MOD 30 MIN: CPT | Performed by: NURSE PRACTITIONER

## 2021-06-01 PROCEDURE — 87140 CULTURE TYPE IMMUNOFLUORESC: CPT | Performed by: NURSE PRACTITIONER

## 2021-06-01 PROCEDURE — 87255 GENET VIRUS ISOLATE HSV: CPT | Performed by: NURSE PRACTITIONER

## 2021-06-01 RX ORDER — BACITRACIN, NEOMYCIN, POLYMYXIN B 400; 3.5; 5 [USP'U]/G; MG/G; [USP'U]/G
OINTMENT TOPICAL 2 TIMES DAILY
Qty: 30 G | Refills: 1 | Status: SHIPPED | OUTPATIENT
Start: 2021-06-01

## 2021-06-01 RX ORDER — CLOTRIMAZOLE AND BETAMETHASONE DIPROPIONATE 10; .64 MG/G; MG/G
CREAM TOPICAL 2 TIMES DAILY
Qty: 30 G | Refills: 0 | Status: SHIPPED | OUTPATIENT
Start: 2021-06-01

## 2021-06-01 NOTE — PROGRESS NOTES
Assessment and Plan:    Problem List Items Addressed This Visit        Genitourinary    Balanitis     Pt can continue with lotrisone cream for some break through areas          Relevant Medications    clotrimazole-betamethasone (LOTRISONE) 1-0 05 % cream       Other    Penile lesion - Primary     Concerned about penil lesion  I have swabbed the lesion today for herpes as patient does have history of herpes but not on valtrex and recent blood work was negative  Other concerns would be wound from recent balanitis versus cancerous lesion  Patient urged to contact urology  Relevant Medications    neomycin-bacitracin-polymyxin b (NEOSPORIN) ointment    Other Relevant Orders    Herpes Simplex Virus Culture with Typing                 Diagnoses and all orders for this visit:    Penile lesion  -     Herpes Simplex Virus Culture with Typing; Future  -     neomycin-bacitracin-polymyxin b (NEOSPORIN) ointment; Apply topically 2 (two) times a day  -     Herpes Simplex Virus Culture with Typing    Balanitis  -     clotrimazole-betamethasone (LOTRISONE) 1-0 05 % cream; Apply topically 2 (two) times a day              Subjective:      Patient ID: Deena Gilliland is a 37 y o  male  CC:    Chief Complaint   Patient presents with    Follow-up     patient is here for a follow up from last month        HPI:    Patient presents today due to lesion on penile shaft since being seen last month  Patient has balanitis and was treated with lortisone and he reports significant improvement  He is concerned today because the skin is not healed and he is having pain  He also states that on the other side the skin peeled again and it is red and painful  Patient has STI testing which was negative         The following portions of the patient's history were reviewed and updated as appropriate: allergies, current medications, past family history, past medical history, past social history, past surgical history and problem list       Review of Systems   Constitutional: Negative  Respiratory: Negative  Cardiovascular: Negative  Genitourinary: Positive for genital sores and penile pain  Negative for difficulty urinating, discharge, dysuria, hematuria, penile swelling, scrotal swelling and testicular pain  Data to review:       Objective:    Vitals:    06/01/21 1406   BP: 120/70   BP Location: Left arm   Patient Position: Sitting   Cuff Size: Adult   Pulse: (!) 54   Resp: 18   Weight: 71 2 kg (157 lb)   Height: 5' 6" (1 676 m)        Physical Exam  Vitals signs and nursing note reviewed  Constitutional:       General: He is not in acute distress  Appearance: Normal appearance  He is not ill-appearing, toxic-appearing or diaphoretic  HENT:      Head: Normocephalic and atraumatic  Right Ear: External ear normal       Left Ear: External ear normal    Eyes:      Extraocular Movements: Extraocular movements intact  Conjunctiva/sclera: Conjunctivae normal    Cardiovascular:      Rate and Rhythm: Normal rate and regular rhythm  Heart sounds: Normal heart sounds, S1 normal and S2 normal  No murmur  Pulmonary:      Effort: Pulmonary effort is normal  No respiratory distress  Breath sounds: Normal breath sounds  No wheezing  Genitourinary:     Penis: Uncircumcised  Lesions (left cancerous sore to the body of the penis and new flesh ulceration red irregular to the right side of the body of the penis  non draining both tender ) present  No discharge  Neurological:      Mental Status: He is alert and oriented to person, place, and time  Psychiatric:         Mood and Affect: Mood normal          Behavior: Behavior normal          Thought Content: Thought content normal          Judgment: Judgment normal            BMI Counseling: Body mass index is 25 34 kg/m²   The BMI is above normal  Nutrition recommendations include decreasing portion sizes, moderation in carbohydrate intake, reducing intake of saturated and trans fat and reducing intake of cholesterol  Exercise recommendations include moderate physical activity 150 minutes/week and strength training exercises

## 2021-06-01 NOTE — ASSESSMENT & PLAN NOTE
Concerned about penil lesion  I have swabbed the lesion today for herpes as patient does have history of herpes but not on valtrex and recent blood work was negative  Other concerns would be wound from recent balanitis versus cancerous lesion  Patient urged to contact urology

## 2021-06-04 LAB
HSV SPEC CULT: ABNORMAL
HSV1 SPEC QL CULT: ABNORMAL

## 2021-06-07 DIAGNOSIS — A60.01 HERPES SIMPLEX INFECTION OF PENIS: Primary | ICD-10-CM

## 2021-06-07 RX ORDER — VALACYCLOVIR HYDROCHLORIDE 1 G/1
1000 TABLET, FILM COATED ORAL 2 TIMES DAILY
Qty: 20 TABLET | Refills: 0 | Status: SHIPPED | OUTPATIENT
Start: 2021-06-07 | End: 2021-06-10 | Stop reason: SDUPTHER

## 2021-06-10 ENCOUNTER — TELEPHONE (OUTPATIENT)
Dept: FAMILY MEDICINE CLINIC | Facility: CLINIC | Age: 36
End: 2021-06-10

## 2021-06-10 DIAGNOSIS — A60.01 HERPES SIMPLEX INFECTION OF PENIS: ICD-10-CM

## 2021-06-10 RX ORDER — VALACYCLOVIR HYDROCHLORIDE 1 G/1
1000 TABLET, FILM COATED ORAL 2 TIMES DAILY
Qty: 20 TABLET | Refills: 0 | Status: SHIPPED | OUTPATIENT
Start: 2021-06-10 | End: 2021-10-01 | Stop reason: SDUPTHER

## 2021-06-10 NOTE — TELEPHONE ENCOUNTER
----- Message from Remy Sanchez MA sent at 6/10/2021  9:42 AM EDT -----  Pt called back, florence informed him of results and states he will need medication sent over to the pharmacy again as he had " house " that threw away his medication  Please resend med to pharmacy  Thank you

## 2021-08-19 ENCOUNTER — OFFICE VISIT (OUTPATIENT)
Dept: FAMILY MEDICINE CLINIC | Facility: CLINIC | Age: 36
End: 2021-08-19
Payer: COMMERCIAL

## 2021-08-19 VITALS
HEIGHT: 66 IN | HEART RATE: 58 BPM | TEMPERATURE: 98.2 F | SYSTOLIC BLOOD PRESSURE: 102 MMHG | DIASTOLIC BLOOD PRESSURE: 66 MMHG | RESPIRATION RATE: 16 BRPM | BODY MASS INDEX: 25.52 KG/M2 | WEIGHT: 158.8 LBS

## 2021-08-19 DIAGNOSIS — K59.00 CONSTIPATION, UNSPECIFIED CONSTIPATION TYPE: ICD-10-CM

## 2021-08-19 DIAGNOSIS — I51.7 LVH (LEFT VENTRICULAR HYPERTROPHY): ICD-10-CM

## 2021-08-19 DIAGNOSIS — E55.9 VITAMIN D DEFICIENCY: ICD-10-CM

## 2021-08-19 DIAGNOSIS — R06.02 SOB (SHORTNESS OF BREATH): Primary | ICD-10-CM

## 2021-08-19 DIAGNOSIS — R42 DIZZINESS: ICD-10-CM

## 2021-08-19 DIAGNOSIS — M43.06 LUMBAR PARS DEFECT: ICD-10-CM

## 2021-08-19 DIAGNOSIS — R07.89 CHEST PRESSURE: ICD-10-CM

## 2021-08-19 PROCEDURE — 93000 ELECTROCARDIOGRAM COMPLETE: CPT | Performed by: NURSE PRACTITIONER

## 2021-08-19 PROCEDURE — 99214 OFFICE O/P EST MOD 30 MIN: CPT | Performed by: NURSE PRACTITIONER

## 2021-08-19 RX ORDER — MELOXICAM 15 MG/1
15 TABLET ORAL DAILY PRN
Qty: 30 TABLET | Refills: 3 | Status: SHIPPED | OUTPATIENT
Start: 2021-08-19

## 2021-08-19 RX ORDER — METHOCARBAMOL 500 MG/1
500 TABLET, FILM COATED ORAL DAILY PRN
Qty: 30 TABLET | Refills: 3 | Status: SHIPPED | OUTPATIENT
Start: 2021-08-19

## 2021-08-19 NOTE — ASSESSMENT & PLAN NOTE
Seen on in office ekg  Will have patient get stress echo  Refer to cardiology for symptoms as well  No evidence of hypertension today visit or in  Previous visits

## 2021-08-19 NOTE — PROGRESS NOTES
Assessment and Plan:    Problem List Items Addressed This Visit        Cardiovascular and Mediastinum    LVH (left ventricular hypertrophy)     Seen on in office ekg  Will have patient get stress echo  Refer to cardiology for symptoms as well  No evidence of hypertension today visit or in  Previous visits  Musculoskeletal and Integument    Lumbar pars defect     Patient has intermittent pain on the left side radiates to the leg  Intermittent management includes anti-inflammatories and muscle relaxer as needed  Relevant Medications    meloxicam (MOBIC) 15 mg tablet    methocarbamol (ROBAXIN) 500 mg tablet       Other    Constipation     Doing well with linzess  meds refilled  Relevant Medications    linaCLOtide 72 MCG CAPS    Vitamin D deficiency     Continues with vitamin d supplement  Labs to recheck          Relevant Orders    Vitamin D 25 hydroxy      Other Visit Diagnoses     SOB (shortness of breath)    -  Primary    Relevant Orders    POCT ECG (Completed)    Echo stress test w contrast if indicated    Ambulatory referral to Cardiology    Dizziness        Relevant Orders    POCT ECG (Completed)    Ambulatory referral to Cardiology    Chest pressure        Relevant Orders    Echo stress test w contrast if indicated    Ambulatory referral to Cardiology    Lipid panel    Comprehensive metabolic panel    TSH, 3rd generation with Free T4 reflex    CBC and differential                 Diagnoses and all orders for this visit:    SOB (shortness of breath)  -     POCT ECG  -     Echo stress test w contrast if indicated; Future  -     Ambulatory referral to Cardiology; Future    Constipation, unspecified constipation type  -     Discontinue: linaCLOtide 72 MCG CAPS; Take 1 caplet by mouth daily as needed (constipation)  -     linaCLOtide 72 MCG CAPS; Take 1 caplet by mouth daily as needed (constipation)    Dizziness  -     POCT ECG  -     Ambulatory referral to Cardiology;  Future    Chest pressure  -     Echo stress test w contrast if indicated; Future  -     Ambulatory referral to Cardiology; Future  -     Lipid panel  -     Comprehensive metabolic panel; Future  -     TSH, 3rd generation with Free T4 reflex; Future  -     CBC and differential; Future    Lumbar pars defect  -     meloxicam (MOBIC) 15 mg tablet; Take 1 tablet (15 mg total) by mouth daily as needed (back pain)  -     methocarbamol (ROBAXIN) 500 mg tablet; Take 1 tablet (500 mg total) by mouth daily as needed for muscle spasms (back pain)    Vitamin D deficiency  -     Vitamin D 25 hydroxy; Future    LVH (left ventricular hypertrophy)              Subjective:      Patient ID: Roderick Case is a 37 y o  male  CC:    Chief Complaint   Patient presents with    Dizziness     Patient here for dizziness, lightheadedness, weakness, pressure in the neck veins    Shortness of Breath     Patient here for SOB, pt states it occurs durind down time       HPI:    Patient present for new onset chest pain, sob, blurry vision on Saturday  He reports he has been checking his blood pressure but they have been normal   He had some dizziness last night and mostly feels pressure in his neck veins which he reports also makes him dizzy  There is no family history of heart issues  Patient has never experienced this before  He exercises regularly without symptoms  These episodes occur mostly at rest    He also states he making sure he is hydrated well  Pt reports his constipation is well managed by the linzess  The following portions of the patient's history were reviewed and updated as appropriate: allergies, current medications, past family history, past medical history, past social history, past surgical history and problem list       Review of Systems   Constitutional: Positive for fatigue  Negative for chills and fever  HENT: Negative for ear pain and sore throat  Eyes: Positive for visual disturbance (blurry vision)  Negative for pain  Respiratory: Positive for shortness of breath  Negative for cough  Cardiovascular: Positive for chest pain  Negative for palpitations  Gastrointestinal: Negative for abdominal pain and vomiting  Genitourinary: Negative for dysuria and hematuria  Musculoskeletal: Negative for arthralgias and back pain  Skin: Negative for color change and rash  Neurological: Positive for dizziness, weakness and light-headedness  Negative for seizures and syncope  All other systems reviewed and are negative  Data to review:       Objective:    Vitals:    08/19/21 1332   BP: 102/66   BP Location: Left arm   Patient Position: Sitting   Cuff Size: Adult   Pulse: 58   Resp: 16   Temp: 98 2 °F (36 8 °C)   TempSrc: Temporal   Weight: 72 kg (158 lb 12 8 oz)   Height: 5' 6" (1 676 m)        Physical Exam  Vitals and nursing note reviewed  Constitutional:       General: He is not in acute distress  Appearance: Normal appearance  He is well-developed  He is not ill-appearing, toxic-appearing or diaphoretic  HENT:      Head: Normocephalic and atraumatic  Right Ear: External ear normal       Left Ear: External ear normal    Eyes:      General: Lids are normal       Extraocular Movements: Extraocular movements intact  Conjunctiva/sclera: Conjunctivae normal    Neck:      Vascular: No carotid bruit or JVD  Cardiovascular:      Rate and Rhythm: Normal rate and regular rhythm  Heart sounds: Normal heart sounds, S1 normal and S2 normal  No murmur heard  Pulmonary:      Effort: Pulmonary effort is normal  No respiratory distress  Breath sounds: Normal breath sounds  No wheezing  Neurological:      Mental Status: He is alert and oriented to person, place, and time  Psychiatric:         Mood and Affect: Mood normal          Behavior: Behavior normal          Thought Content:  Thought content normal          Judgment: Judgment normal

## 2021-08-19 NOTE — ASSESSMENT & PLAN NOTE
Patient has intermittent pain on the left side radiates to the leg  Intermittent management includes anti-inflammatories and muscle relaxer as needed

## 2021-08-26 ENCOUNTER — APPOINTMENT (OUTPATIENT)
Dept: LAB | Facility: HOSPITAL | Age: 36
End: 2021-08-26
Payer: COMMERCIAL

## 2021-08-26 DIAGNOSIS — E55.9 VITAMIN D DEFICIENCY: ICD-10-CM

## 2021-08-26 DIAGNOSIS — R07.89 CHEST PRESSURE: ICD-10-CM

## 2021-08-26 LAB
25(OH)D3 SERPL-MCNC: 29.6 NG/ML (ref 30–100)
ALBUMIN SERPL BCP-MCNC: 4.2 G/DL (ref 3.5–5)
ALP SERPL-CCNC: 36 U/L (ref 46–116)
ALT SERPL W P-5'-P-CCNC: 38 U/L (ref 12–78)
ANION GAP SERPL CALCULATED.3IONS-SCNC: 6 MMOL/L (ref 4–13)
AST SERPL W P-5'-P-CCNC: 30 U/L (ref 5–45)
BASOPHILS # BLD AUTO: 0.03 THOUSANDS/ΜL (ref 0–0.1)
BASOPHILS NFR BLD AUTO: 1 % (ref 0–1)
BILIRUB SERPL-MCNC: 0.5 MG/DL (ref 0.2–1)
BILIRUB UR QL STRIP: NEGATIVE
BUN SERPL-MCNC: 16 MG/DL (ref 5–25)
CALCIUM SERPL-MCNC: 9.3 MG/DL (ref 8.3–10.1)
CHLORIDE SERPL-SCNC: 105 MMOL/L (ref 100–108)
CHOLEST SERPL-MCNC: 205 MG/DL (ref 50–200)
CLARITY UR: CLEAR
CO2 SERPL-SCNC: 29 MMOL/L (ref 21–32)
COLOR UR: YELLOW
CREAT SERPL-MCNC: 1.06 MG/DL (ref 0.6–1.3)
EOSINOPHIL # BLD AUTO: 0.18 THOUSAND/ΜL (ref 0–0.61)
EOSINOPHIL NFR BLD AUTO: 3 % (ref 0–6)
ERYTHROCYTE [DISTWIDTH] IN BLOOD BY AUTOMATED COUNT: 12.9 % (ref 11.6–15.1)
GFR SERPL CREATININE-BSD FRML MDRD: 92 ML/MIN/1.73SQ M
GLUCOSE P FAST SERPL-MCNC: 96 MG/DL (ref 65–99)
GLUCOSE UR STRIP-MCNC: NEGATIVE MG/DL
HCT VFR BLD AUTO: 47.7 % (ref 36.5–49.3)
HDLC SERPL-MCNC: 60 MG/DL
HGB BLD-MCNC: 15.4 G/DL (ref 12–17)
HGB UR QL STRIP.AUTO: NEGATIVE
IMM GRANULOCYTES # BLD AUTO: 0.01 THOUSAND/UL (ref 0–0.2)
IMM GRANULOCYTES NFR BLD AUTO: 0 % (ref 0–2)
KETONES UR STRIP-MCNC: NEGATIVE MG/DL
LDLC SERPL CALC-MCNC: 133 MG/DL (ref 0–100)
LEUKOCYTE ESTERASE UR QL STRIP: NEGATIVE
LYMPHOCYTES # BLD AUTO: 1.93 THOUSANDS/ΜL (ref 0.6–4.47)
LYMPHOCYTES NFR BLD AUTO: 35 % (ref 14–44)
MCH RBC QN AUTO: 28.9 PG (ref 26.8–34.3)
MCHC RBC AUTO-ENTMCNC: 32.3 G/DL (ref 31.4–37.4)
MCV RBC AUTO: 90 FL (ref 82–98)
MONOCYTES # BLD AUTO: 0.36 THOUSAND/ΜL (ref 0.17–1.22)
MONOCYTES NFR BLD AUTO: 7 % (ref 4–12)
NEUTROPHILS # BLD AUTO: 2.99 THOUSANDS/ΜL (ref 1.85–7.62)
NEUTS SEG NFR BLD AUTO: 54 % (ref 43–75)
NITRITE UR QL STRIP: NEGATIVE
NONHDLC SERPL-MCNC: 145 MG/DL
NRBC BLD AUTO-RTO: 0 /100 WBCS
PH UR STRIP.AUTO: 6 [PH]
PLATELET # BLD AUTO: 203 THOUSANDS/UL (ref 149–390)
PMV BLD AUTO: 10.6 FL (ref 8.9–12.7)
POTASSIUM SERPL-SCNC: 4.3 MMOL/L (ref 3.5–5.3)
PROT SERPL-MCNC: 7.4 G/DL (ref 6.4–8.2)
PROT UR STRIP-MCNC: NEGATIVE MG/DL
RBC # BLD AUTO: 5.32 MILLION/UL (ref 3.88–5.62)
SODIUM SERPL-SCNC: 140 MMOL/L (ref 136–145)
SP GR UR STRIP.AUTO: 1.02 (ref 1–1.03)
TRIGL SERPL-MCNC: 62 MG/DL
TSH SERPL DL<=0.05 MIU/L-ACNC: 0.68 UIU/ML (ref 0.36–3.74)
UROBILINOGEN UR QL STRIP.AUTO: 0.2 E.U./DL
WBC # BLD AUTO: 5.5 THOUSAND/UL (ref 4.31–10.16)

## 2021-08-26 PROCEDURE — 80053 COMPREHEN METABOLIC PANEL: CPT

## 2021-08-26 PROCEDURE — 84443 ASSAY THYROID STIM HORMONE: CPT

## 2021-08-26 PROCEDURE — 82306 VITAMIN D 25 HYDROXY: CPT

## 2021-08-26 PROCEDURE — 85025 COMPLETE CBC W/AUTO DIFF WBC: CPT

## 2021-08-26 PROCEDURE — 36415 COLL VENOUS BLD VENIPUNCTURE: CPT

## 2021-08-26 PROCEDURE — 80061 LIPID PANEL: CPT | Performed by: NURSE PRACTITIONER

## 2021-08-26 PROCEDURE — 81003 URINALYSIS AUTO W/O SCOPE: CPT | Performed by: NURSE PRACTITIONER

## 2021-08-30 ENCOUNTER — HOSPITAL ENCOUNTER (OUTPATIENT)
Dept: NON INVASIVE DIAGNOSTICS | Facility: HOSPITAL | Age: 36
Discharge: HOME | End: 2021-08-30
Payer: COMMERCIAL

## 2021-08-30 DIAGNOSIS — R06.02 SOB (SHORTNESS OF BREATH): ICD-10-CM

## 2021-08-30 DIAGNOSIS — R07.89 CHEST PRESSURE: ICD-10-CM

## 2021-08-30 LAB
CHEST PAIN STATEMENT: NORMAL
MAX DIASTOLIC BP: 66 MMHG
MAX HEART RATE: 181 BPM
MAX PREDICTED HEART RATE: 187 BPM
MAX. SYSTOLIC BP: 188 MMHG
PROTOCOL NAME: NORMAL
REASON FOR TERMINATION: NORMAL
TARGET HR FORMULA: NORMAL
TEST INDICATION: NORMAL
TIME IN EXERCISE PHASE: NORMAL

## 2021-08-30 PROCEDURE — 93351 STRESS TTE COMPLETE: CPT | Performed by: INTERNAL MEDICINE

## 2021-08-30 PROCEDURE — 93350 STRESS TTE ONLY: CPT

## 2021-10-01 ENCOUNTER — TELEPHONE (OUTPATIENT)
Dept: FAMILY MEDICINE CLINIC | Facility: CLINIC | Age: 36
End: 2021-10-01

## 2021-10-01 DIAGNOSIS — A60.01 HERPES SIMPLEX INFECTION OF PENIS: ICD-10-CM

## 2021-10-01 RX ORDER — VALACYCLOVIR HYDROCHLORIDE 1 G/1
1000 TABLET, FILM COATED ORAL 2 TIMES DAILY
Qty: 20 TABLET | Refills: 0 | Status: SHIPPED | OUTPATIENT
Start: 2021-10-01 | End: 2021-10-12 | Stop reason: SDUPTHER

## 2021-10-07 ENCOUNTER — OFFICE VISIT (OUTPATIENT)
Dept: UROLOGY | Facility: MEDICAL CENTER | Age: 36
End: 2021-10-07
Payer: COMMERCIAL

## 2021-10-07 VITALS
HEART RATE: 64 BPM | SYSTOLIC BLOOD PRESSURE: 120 MMHG | WEIGHT: 158 LBS | DIASTOLIC BLOOD PRESSURE: 68 MMHG | BODY MASS INDEX: 25.39 KG/M2 | HEIGHT: 66 IN

## 2021-10-07 DIAGNOSIS — E29.1 HYPOGONADISM IN MALE: ICD-10-CM

## 2021-10-07 DIAGNOSIS — N52.9 ERECTILE DYSFUNCTION, UNSPECIFIED ERECTILE DYSFUNCTION TYPE: Primary | ICD-10-CM

## 2021-10-07 PROCEDURE — 99214 OFFICE O/P EST MOD 30 MIN: CPT | Performed by: NURSE PRACTITIONER

## 2021-10-07 PROCEDURE — 3008F BODY MASS INDEX DOCD: CPT | Performed by: NURSE PRACTITIONER

## 2021-10-11 ENCOUNTER — TELEPHONE (OUTPATIENT)
Dept: FAMILY MEDICINE CLINIC | Facility: CLINIC | Age: 36
End: 2021-10-11

## 2021-10-11 DIAGNOSIS — A60.01 HERPES SIMPLEX INFECTION OF PENIS: ICD-10-CM

## 2021-10-12 RX ORDER — VALACYCLOVIR HYDROCHLORIDE 1 G/1
1000 TABLET, FILM COATED ORAL 2 TIMES DAILY
Qty: 20 TABLET | Refills: 0 | Status: SHIPPED | OUTPATIENT
Start: 2021-10-12 | End: 2021-10-22

## 2021-11-08 ENCOUNTER — EVALUATION (OUTPATIENT)
Dept: PHYSICAL THERAPY | Facility: REHABILITATION | Age: 36
End: 2021-11-08
Payer: COMMERCIAL

## 2021-11-08 DIAGNOSIS — N52.9 ERECTILE DYSFUNCTION, UNSPECIFIED ERECTILE DYSFUNCTION TYPE: ICD-10-CM

## 2021-11-08 DIAGNOSIS — M62.89 PELVIC FLOOR DYSFUNCTION: Primary | ICD-10-CM

## 2021-11-08 PROCEDURE — 97163 PT EVAL HIGH COMPLEX 45 MIN: CPT | Performed by: PHYSICAL THERAPIST

## 2021-11-15 ENCOUNTER — OFFICE VISIT (OUTPATIENT)
Dept: PHYSICAL THERAPY | Facility: REHABILITATION | Age: 36
End: 2021-11-15
Payer: COMMERCIAL

## 2021-11-15 DIAGNOSIS — M62.89 PELVIC FLOOR DYSFUNCTION: Primary | ICD-10-CM

## 2021-11-15 DIAGNOSIS — N52.9 ERECTILE DYSFUNCTION, UNSPECIFIED ERECTILE DYSFUNCTION TYPE: ICD-10-CM

## 2021-11-15 PROCEDURE — 97140 MANUAL THERAPY 1/> REGIONS: CPT | Performed by: PHYSICAL THERAPIST

## 2021-11-15 PROCEDURE — 97110 THERAPEUTIC EXERCISES: CPT | Performed by: PHYSICAL THERAPIST

## 2021-11-29 ENCOUNTER — APPOINTMENT (OUTPATIENT)
Dept: PHYSICAL THERAPY | Facility: REHABILITATION | Age: 36
End: 2021-11-29
Payer: COMMERCIAL

## 2021-12-03 ENCOUNTER — APPOINTMENT (OUTPATIENT)
Dept: PHYSICAL THERAPY | Facility: REHABILITATION | Age: 36
End: 2021-12-03
Payer: COMMERCIAL

## 2021-12-06 ENCOUNTER — APPOINTMENT (OUTPATIENT)
Dept: PHYSICAL THERAPY | Facility: REHABILITATION | Age: 36
End: 2021-12-06
Payer: COMMERCIAL

## 2021-12-13 ENCOUNTER — OFFICE VISIT (OUTPATIENT)
Dept: PHYSICAL THERAPY | Facility: REHABILITATION | Age: 36
End: 2021-12-13
Payer: COMMERCIAL

## 2021-12-13 DIAGNOSIS — M62.89 PELVIC FLOOR DYSFUNCTION: Primary | ICD-10-CM

## 2021-12-13 DIAGNOSIS — N52.9 ERECTILE DYSFUNCTION, UNSPECIFIED ERECTILE DYSFUNCTION TYPE: ICD-10-CM

## 2021-12-13 PROCEDURE — 97110 THERAPEUTIC EXERCISES: CPT | Performed by: PHYSICAL THERAPIST

## 2021-12-13 PROCEDURE — 97140 MANUAL THERAPY 1/> REGIONS: CPT | Performed by: PHYSICAL THERAPIST

## 2021-12-20 ENCOUNTER — OFFICE VISIT (OUTPATIENT)
Dept: PHYSICAL THERAPY | Facility: REHABILITATION | Age: 36
End: 2021-12-20
Payer: COMMERCIAL

## 2021-12-20 DIAGNOSIS — N52.9 ERECTILE DYSFUNCTION, UNSPECIFIED ERECTILE DYSFUNCTION TYPE: ICD-10-CM

## 2021-12-20 DIAGNOSIS — M62.89 PELVIC FLOOR DYSFUNCTION: Primary | ICD-10-CM

## 2021-12-20 PROCEDURE — 97140 MANUAL THERAPY 1/> REGIONS: CPT | Performed by: PHYSICAL THERAPIST

## 2021-12-20 PROCEDURE — 97110 THERAPEUTIC EXERCISES: CPT | Performed by: PHYSICAL THERAPIST

## 2021-12-20 PROCEDURE — 97112 NEUROMUSCULAR REEDUCATION: CPT | Performed by: PHYSICAL THERAPIST

## 2021-12-29 ENCOUNTER — APPOINTMENT (OUTPATIENT)
Dept: PHYSICAL THERAPY | Facility: REHABILITATION | Age: 36
End: 2021-12-29
Payer: COMMERCIAL

## 2022-03-04 ENCOUNTER — OFFICE VISIT (OUTPATIENT)
Dept: FAMILY MEDICINE CLINIC | Facility: CLINIC | Age: 37
End: 2022-03-04
Payer: COMMERCIAL

## 2022-03-04 VITALS
DIASTOLIC BLOOD PRESSURE: 62 MMHG | TEMPERATURE: 97.2 F | SYSTOLIC BLOOD PRESSURE: 108 MMHG | BODY MASS INDEX: 25.9 KG/M2 | HEIGHT: 66 IN | WEIGHT: 161.13 LBS

## 2022-03-04 DIAGNOSIS — Z13.1 SCREENING FOR DIABETES MELLITUS: ICD-10-CM

## 2022-03-04 DIAGNOSIS — G89.29 CHRONIC PAIN OF RIGHT HEEL: Primary | ICD-10-CM

## 2022-03-04 DIAGNOSIS — M79.671 CHRONIC PAIN OF RIGHT HEEL: Primary | ICD-10-CM

## 2022-03-04 DIAGNOSIS — Z13.220 SCREENING FOR LIPID DISORDERS: ICD-10-CM

## 2022-03-04 PROCEDURE — 3725F SCREEN DEPRESSION PERFORMED: CPT | Performed by: NURSE PRACTITIONER

## 2022-03-04 PROCEDURE — 99214 OFFICE O/P EST MOD 30 MIN: CPT | Performed by: NURSE PRACTITIONER

## 2022-03-04 PROCEDURE — 3008F BODY MASS INDEX DOCD: CPT | Performed by: NURSE PRACTITIONER

## 2022-03-04 NOTE — ASSESSMENT & PLAN NOTE
X-rays of right heel and right foot ordered to assess for causes of chronic heel pain  Patient was advised that Tylenol can be taken as needed for pain and he can also ice the area as needed  Podiatry referral was also ordered

## 2022-03-04 NOTE — PROGRESS NOTES
BMI Counseling: Body mass index is 26 01 kg/m²  Assessment and Plan:    Problem List Items Addressed This Visit        Other    Chronic pain of right heel - Primary     X-rays of right heel and right foot ordered to assess for causes of chronic heel pain  Patient was advised that Tylenol can be taken as needed for pain and he can also ice the area as needed  Podiatry referral was also ordered  Relevant Orders    XR heel / calcaneus 2+ vw right    XR foot 3+ vw right    Ambulatory Referral to Podiatry      Other Visit Diagnoses     Screening for diabetes mellitus        Relevant Orders    Comprehensive metabolic panel    UA w Reflex to Microscopic w Reflex to Culture -Lab Collect    Screening for lipid disorders        Relevant Orders    Lipid Panel with Direct LDL reflex                 Diagnoses and all orders for this visit:    Chronic pain of right heel  -     XR heel / calcaneus 2+ vw right; Future  -     XR foot 3+ vw right; Future  -     Ambulatory Referral to Podiatry; Future    Screening for diabetes mellitus  -     Comprehensive metabolic panel; Future  -     UA w Reflex to Microscopic w Reflex to Culture -Lab Collect; Future    Screening for lipid disorders  -     Lipid Panel with Direct LDL reflex; Future              Subjective:      Patient ID: Amena Sarkar is a 31 y o  male  CC:    Chief Complaint   Patient presents with    Heel Pain     right heel pain pt states he has had it for 20 years  Pt states it is a throbbing pain    mgb       HPI:    Right heel pain: Patient reports that he stepped on glass 20 years ago and he cut his right heel area severely  He states that he has been dealing with pain in this charla since that time but more recently the pain has become a constant throbbing  He denies that pain getting worse when standing on the foot for extended periods of time  He denies any weakness, numbness, or tingling in his foot   He denies any radiation of the pain to other areas of his foot  He denies any decreased range of motion of his foot  He reports he is currently not taking anything for pain  The following portions of the patient's history were reviewed and updated as appropriate: allergies, current medications, past family history, past medical history, past social history, past surgical history and problem list       Review of Systems   Constitutional: Negative for chills and fever  HENT: Negative for ear pain and sore throat  Eyes: Negative for pain and visual disturbance  Respiratory: Negative for cough, chest tightness, shortness of breath and wheezing  Cardiovascular: Negative for chest pain, palpitations and leg swelling  Gastrointestinal: Negative for abdominal pain, constipation, diarrhea, nausea and vomiting  Endocrine: Negative for cold intolerance and heat intolerance  Genitourinary: Negative for decreased urine volume, dysuria and hematuria  Musculoskeletal: Positive for arthralgias (right heel pain)  Negative for back pain, joint swelling and myalgias  Skin: Negative for color change and rash  Allergic/Immunologic: Negative for environmental allergies  Neurological: Negative for dizziness, seizures, syncope, weakness, light-headedness, numbness and headaches  Hematological: Negative for adenopathy  Psychiatric/Behavioral: Negative for confusion  The patient is not nervous/anxious  All other systems reviewed and are negative  Data to review:       Objective:    Vitals:    03/04/22 1430   BP: 108/62   BP Location: Right arm   Patient Position: Sitting   Cuff Size: Standard   Temp: (!) 97 2 °F (36 2 °C)   TempSrc: Temporal   Weight: 73 1 kg (161 lb 2 oz)   Height: 5' 6" (1 676 m)        Physical Exam  Vitals and nursing note reviewed  Constitutional:       General: He is not in acute distress  Appearance: Normal appearance  He is well-developed  He is not ill-appearing  HENT:      Head: Normocephalic and atraumatic  Eyes:      Conjunctiva/sclera: Conjunctivae normal    Cardiovascular:      Rate and Rhythm: Normal rate and regular rhythm  Pulses: Normal pulses  Carotid pulses are 2+ on the right side and 2+ on the left side  Radial pulses are 2+ on the right side and 2+ on the left side  Dorsalis pedis pulses are 2+ on the right side and 2+ on the left side  Posterior tibial pulses are 2+ on the right side and 2+ on the left side  Heart sounds: Normal heart sounds  No murmur heard  Pulmonary:      Effort: Pulmonary effort is normal  No respiratory distress  Breath sounds: Normal breath sounds  No wheezing or rhonchi  Abdominal:      General: Abdomen is flat  Bowel sounds are normal  There is no distension  Palpations: Abdomen is soft  Tenderness: There is no abdominal tenderness  There is no guarding  Musculoskeletal:         General: Normal range of motion  Cervical back: Normal range of motion and neck supple  Right lower leg: No edema  Left lower leg: No edema  Right foot: Normal range of motion  No deformity, bunion, Charcot foot, foot drop or prominent metatarsal heads  Feet:      Comments: Full range of motion noted in right foot  No pain noted with dorsiflexion or plantar flexion  Patient also reported no pain when right heel was palpated  No erythema, drainage, warmth to touch, wounds, or other abnormalities were noted in right heel  Skin:     General: Skin is warm and dry  Capillary Refill: Capillary refill takes less than 2 seconds  Neurological:      General: No focal deficit present  Mental Status: He is alert and oriented to person, place, and time  Psychiatric:         Mood and Affect: Mood normal          Behavior: Behavior normal          Thought Content: Thought content normal          Judgment: Judgment normal            BMI Counseling: Body mass index is 26 01 kg/m²   The BMI is above normal  Nutrition recommendations include decreasing portion sizes, encouraging healthy choices of fruits and vegetables, moderation in carbohydrate intake and increasing intake of lean protein  Exercise recommendations include exercising 3-5 times per week and obtaining a gym membership  No pharmacotherapy was ordered  Rationale for BMI follow-up plan is due to patient being overweight or obese  Depression Screening and Follow-up Plan: Patient was screened for depression during today's encounter  They screened negative with a PHQ-2 score of 0

## 2022-06-08 ENCOUNTER — OFFICE VISIT (OUTPATIENT)
Dept: FAMILY MEDICINE CLINIC | Facility: CLINIC | Age: 37
End: 2022-06-08
Payer: COMMERCIAL

## 2022-06-08 VITALS
BODY MASS INDEX: 25.75 KG/M2 | SYSTOLIC BLOOD PRESSURE: 110 MMHG | WEIGHT: 160.2 LBS | HEART RATE: 55 BPM | TEMPERATURE: 97.7 F | RESPIRATION RATE: 14 BRPM | HEIGHT: 66 IN | DIASTOLIC BLOOD PRESSURE: 64 MMHG

## 2022-06-08 DIAGNOSIS — L98.9 SKIN LESION: Primary | ICD-10-CM

## 2022-06-08 DIAGNOSIS — Z00.00 ANNUAL PHYSICAL EXAM: Primary | ICD-10-CM

## 2022-06-08 DIAGNOSIS — K59.00 CONSTIPATION, UNSPECIFIED CONSTIPATION TYPE: ICD-10-CM

## 2022-06-08 PROCEDURE — 3008F BODY MASS INDEX DOCD: CPT | Performed by: NURSE PRACTITIONER

## 2022-06-08 PROCEDURE — 99395 PREV VISIT EST AGE 18-39: CPT | Performed by: NURSE PRACTITIONER

## 2022-06-08 RX ORDER — KETOCONAZOLE 20 MG/G
CREAM TOPICAL DAILY
Qty: 15 G | Refills: 0 | Status: SHIPPED | OUTPATIENT
Start: 2022-06-08

## 2022-06-08 NOTE — PROGRESS NOTES
ADULT ANNUAL [address] [facility] PRIMARY CARE    NAME: Bernarda Antoine  AGE: 31 y o  SEX: male  : 1985     DATE: 2022     Assessment and Plan:     Problem List Items Addressed This Visit        Other    Constipation    Relevant Medications    linaCLOtide 72 MCG CAPS    Annual physical exam - Primary     Patient advised to have his routine blood work completed  Immunizations and preventive care screenings were discussed with patient today  Appropriate education was printed on patient's after visit summary  Counseling:  Alcohol/drug use: discussed moderation in alcohol intake, the recommendations for healthy alcohol use, and avoidance of illicit drug use  Dental Health: discussed importance of regular tooth brushing, flossing, and dental visits  Sexual health: discussed sexually transmitted diseases, partner selection, use of condoms, avoidance of unintended pregnancy, and contraceptive alternatives  Exercise: the importance of regular exercise/physical activity was discussed  Recommend exercise 3-5 times per week for at least 30 minutes  No follow-ups on file  Chief Complaint:     Chief Complaint   Patient presents with    Physical Exam     Patient in office for annual check up      History of Present Illness:     Adult Annual Physical   Patient here for a comprehensive physical exam  The patient reports no problems  Diet and Physical Activity  Diet/Nutrition: well balanced diet  Exercise: moderate cardiovascular exercise and 1-2 times a week on average  Depression Screening  PHQ-2/9 Depression Screening         General Health  Sleep: sleeps poorly and gets 4-6 hours of sleep on average  Hearing: normal - bilateral   Vision: most recent eye exam >1 year ago  Dental: regular dental visits, brushes teeth twice daily and brushes teeth three times daily   Health  Symptoms include: nocturia   Patient wakes up 1-2 times per night  He is not seeing urology anymore  Review of Systems:     Review of Systems   Constitutional: Negative for chills and fever  HENT: Negative for ear pain and sore throat  Eyes: Negative for pain and visual disturbance  Respiratory: Negative for cough and shortness of breath  Cardiovascular: Negative for chest pain and palpitations  Gastrointestinal: Negative for abdominal pain and vomiting  Genitourinary: Negative for dysuria and hematuria  Musculoskeletal: Negative for arthralgias and back pain  Skin: Negative for color change and rash  Neurological: Negative for seizures and syncope  All other systems reviewed and are negative  Past Medical History:     Past Medical History:   Diagnosis Date    Dizziness     hx head injury when child-gets dizzy and headaches      Past Surgical History:     No past surgical history on file     Family History:     Family History   Problem Relation Age of Onset    No Known Problems Mother     Hypertension Father     Hypertension Maternal Grandmother     Vision loss Maternal Grandfather     No Known Problems Paternal Grandmother     No Known Problems Paternal Grandfather       Social History:     Social History     Socioeconomic History    Marital status: /Civil Union     Spouse name: None    Number of children: None    Years of education: None    Highest education level: None   Occupational History    None   Tobacco Use    Smoking status: Never Smoker    Smokeless tobacco: Never Used   Substance and Sexual Activity    Alcohol use: Yes     Comment: social    Drug use: No    Sexual activity: Yes     Partners: Female   Other Topics Concern    None   Social History Narrative    Lives with wife and daughter    Full time  Employment    apartment     Social Determinants of Health     Financial Resource Strain: Not on file   Food Insecurity: Not on file   Transportation Needs: Not on file   Physical Activity: Not on file   Stress: Not on file   Social Connections: Not on file   Intimate Partner Violence: Not on file   Housing Stability: Not on file      Current Medications:     Current Outpatient Medications   Medication Sig Dispense Refill    linaCLOtide 72 MCG CAPS Take 1 caplet by mouth daily as needed (constipation) 30 capsule 5    meloxicam (MOBIC) 15 mg tablet Take 1 tablet (15 mg total) by mouth daily as needed (back pain) 30 tablet 3    methocarbamol (ROBAXIN) 500 mg tablet Take 1 tablet (500 mg total) by mouth daily as needed for muscle spasms (back pain) 30 tablet 3    clomiPHENE (CLOMID) 50 mg tablet Take 1 tablet (50 mg total) by mouth daily (Patient not taking: No sig reported) 90 tablet 3    clotrimazole-betamethasone (LOTRISONE) 1-0 05 % cream Apply topically 2 (two) times a day (Patient not taking: No sig reported) 30 g 0    neomycin-bacitracin-polymyxin b (NEOSPORIN) ointment Apply topically 2 (two) times a day (Patient not taking: No sig reported) 30 g 1    pantoprazole (PROTONIX) 40 mg tablet Take 40 mg by mouth (Patient not taking: No sig reported)      tadalafil (CIALIS) 2 5 MG tablet Take 1 tablet (2 5 mg total) by mouth daily as needed for erectile dysfunction (Patient not taking: No sig reported) 90 tablet 3    traZODone (DESYREL) 50 mg tablet Take 1 tablet (50 mg total) by mouth daily at bedtime as needed for sleep (Patient not taking: No sig reported) 30 tablet 1    valACYclovir (VALTREX) 1,000 mg tablet Take 1 tablet (1,000 mg total) by mouth 2 (two) times a day for 10 days (Patient not taking: Reported on 3/4/2022 ) 20 tablet 0     No current facility-administered medications for this visit        Allergies:     No Known Allergies   Physical Exam:     /64 (BP Location: Right arm, Patient Position: Sitting, Cuff Size: Adult)   Pulse 55   Temp 97 7 °F (36 5 °C) (Temporal)   Resp 14   Ht 5' 6" (1 676 m)   Wt 72 7 kg (160 lb 3 2 oz)   BMI 25 86 kg/m²     Physical Exam  Vitals and nursing note reviewed  Constitutional:       General: He is not in acute distress  Appearance: He is well-developed  He is not diaphoretic  HENT:      Head: Normocephalic and atraumatic  Right Ear: Tympanic membrane and external ear normal       Left Ear: Tympanic membrane and external ear normal       Nose: Nose normal       Mouth/Throat:      Pharynx: Uvula midline  No oropharyngeal exudate  Eyes:      General: No scleral icterus  Conjunctiva/sclera: Conjunctivae normal       Pupils: Pupils are equal, round, and reactive to light  Neck:      Thyroid: No thyromegaly  Vascular: No JVD  Cardiovascular:      Rate and Rhythm: Normal rate and regular rhythm  Heart sounds: Normal heart sounds  Pulmonary:      Effort: Pulmonary effort is normal  No respiratory distress  Breath sounds: Normal breath sounds  Abdominal:      General: Bowel sounds are normal  There is no distension  Palpations: Abdomen is soft  Tenderness: There is no abdominal tenderness  Musculoskeletal:         General: Normal range of motion  Cervical back: Normal range of motion  Lymphadenopathy:      Cervical: No cervical adenopathy  Skin:     General: Skin is warm and dry  Capillary Refill: Capillary refill takes less than 2 seconds  Findings: Rash present  Rash is macular (left lower back hyperpigmented )  Neurological:      General: No focal deficit present  Mental Status: He is alert and oriented to person, place, and time  Coordination: Coordination normal       Gait: Gait normal    Psychiatric:         Thought Content:  Thought content normal           GIULIANO Plata  [facility] PRIMARY CARE

## 2022-06-08 NOTE — PATIENT INSTRUCTIONS

## 2022-06-13 ENCOUNTER — APPOINTMENT (OUTPATIENT)
Dept: LAB | Facility: HOSPITAL | Age: 37
End: 2022-06-13
Payer: COMMERCIAL

## 2022-06-13 DIAGNOSIS — Z13.220 SCREENING FOR LIPID DISORDERS: ICD-10-CM

## 2022-06-13 DIAGNOSIS — Z13.1 SCREENING FOR DIABETES MELLITUS: ICD-10-CM

## 2022-06-13 DIAGNOSIS — E29.1 HYPOGONADISM IN MALE: ICD-10-CM

## 2022-06-13 LAB
ALBUMIN SERPL BCP-MCNC: 3.9 G/DL (ref 3.5–5)
ALP SERPL-CCNC: 45 U/L (ref 46–116)
ALT SERPL W P-5'-P-CCNC: 37 U/L (ref 12–78)
ANION GAP SERPL CALCULATED.3IONS-SCNC: 7 MMOL/L (ref 4–13)
AST SERPL W P-5'-P-CCNC: 28 U/L (ref 5–45)
BILIRUB SERPL-MCNC: 0.52 MG/DL (ref 0.2–1)
BILIRUB UR QL STRIP: NEGATIVE
BUN SERPL-MCNC: 22 MG/DL (ref 5–25)
CALCIUM SERPL-MCNC: 9.2 MG/DL (ref 8.3–10.1)
CHLORIDE SERPL-SCNC: 104 MMOL/L (ref 100–108)
CHOLEST SERPL-MCNC: 198 MG/DL
CLARITY UR: CLEAR
CO2 SERPL-SCNC: 28 MMOL/L (ref 21–32)
COLOR UR: NORMAL
CREAT SERPL-MCNC: 1.08 MG/DL (ref 0.6–1.3)
GFR SERPL CREATININE-BSD FRML MDRD: 89 ML/MIN/1.73SQ M
GLUCOSE P FAST SERPL-MCNC: 89 MG/DL (ref 65–99)
GLUCOSE UR STRIP-MCNC: NEGATIVE MG/DL
HDLC SERPL-MCNC: 59 MG/DL
HGB UR QL STRIP.AUTO: NEGATIVE
KETONES UR STRIP-MCNC: NEGATIVE MG/DL
LDLC SERPL CALC-MCNC: 128 MG/DL (ref 0–100)
LEUKOCYTE ESTERASE UR QL STRIP: NEGATIVE
NITRITE UR QL STRIP: NEGATIVE
PH UR STRIP.AUTO: 6 [PH]
POTASSIUM SERPL-SCNC: 4 MMOL/L (ref 3.5–5.3)
PROT SERPL-MCNC: 7.3 G/DL (ref 6.4–8.2)
PROT UR STRIP-MCNC: NEGATIVE MG/DL
SODIUM SERPL-SCNC: 139 MMOL/L (ref 136–145)
SP GR UR STRIP.AUTO: 1.02 (ref 1–1.03)
TESTOST SERPL-MCNC: 403 NG/DL (ref 113–1065)
TRIGL SERPL-MCNC: 53 MG/DL
UROBILINOGEN UR QL STRIP.AUTO: 0.2 E.U./DL

## 2022-06-13 PROCEDURE — 36415 COLL VENOUS BLD VENIPUNCTURE: CPT

## 2022-06-13 PROCEDURE — 80053 COMPREHEN METABOLIC PANEL: CPT

## 2022-06-13 PROCEDURE — 80061 LIPID PANEL: CPT

## 2022-06-13 PROCEDURE — 84403 ASSAY OF TOTAL TESTOSTERONE: CPT

## 2022-06-13 PROCEDURE — 81003 URINALYSIS AUTO W/O SCOPE: CPT

## 2022-11-07 ENCOUNTER — OFFICE VISIT (OUTPATIENT)
Dept: FAMILY MEDICINE CLINIC | Facility: CLINIC | Age: 37
End: 2022-11-07

## 2022-11-07 VITALS
DIASTOLIC BLOOD PRESSURE: 68 MMHG | HEART RATE: 57 BPM | SYSTOLIC BLOOD PRESSURE: 112 MMHG | WEIGHT: 159.5 LBS | OXYGEN SATURATION: 98 % | HEIGHT: 66 IN | BODY MASS INDEX: 25.63 KG/M2 | TEMPERATURE: 97.5 F

## 2022-11-07 DIAGNOSIS — K62.5 BLOOD PER RECTUM: ICD-10-CM

## 2022-11-07 DIAGNOSIS — R10.9 FLANK PAIN: Primary | ICD-10-CM

## 2022-11-07 LAB
SL AMB  POCT GLUCOSE, UA: NORMAL
SL AMB LEUKOCYTE ESTERASE,UA: NORMAL
SL AMB POCT BILIRUBIN,UA: NORMAL
SL AMB POCT BLOOD,UA: NORMAL
SL AMB POCT CLARITY,UA: NORMAL
SL AMB POCT COLOR,UA: NORMAL
SL AMB POCT KETONES,UA: NORMAL
SL AMB POCT NITRITE,UA: NORMAL
SL AMB POCT PH,UA: 6
SL AMB POCT SPECIFIC GRAVITY,UA: >1.03
SL AMB POCT URINE PROTEIN: NORMAL
SL AMB POCT UROBILINOGEN: 0.2

## 2022-11-07 NOTE — PROGRESS NOTES
Name: Shakira Mitchell      : 1985      MRN: [id number]  Encounter Provider: GIULIANO Shook  Encounter Date: 2022   Encounter department: [facility] PRIMARY [facility]    Assessment & Plan     1  Flank pain  Comments:  urine in office was normal  [place]  will order labs and renal US to evaluate  Orders:  -     POCT urine dip  -     US kidney and bladder; Future; Expected date: 2022  -     Comprehensive metabolic panel; Future    2  Blood per rectum  Comments:  discussed could be related to constipation but patient concerned about cancer would like to have consult with doctor   Orders:  -     Ambulatory Referral to Colorectal Surgery; Future         Subjective      Patient reports he went to DR was was told that he had kidney stones  He also states that about 3-4 weeks now when he goes to the bathroom he can find blood on the toilet paper sometimes its a lot of ther times its just a little  He states it is bright red blood  Not associated with pain  He also does have some skin sensitivity  Reports that he knows a few people diagnosed with colon cancer every young and he is concerned  There is not family history of colon or prostate cancer  Review of Systems   Constitutional: Negative for diaphoresis, fatigue and fever  Respiratory: Negative  Cardiovascular: Negative  Genitourinary: Positive for flank pain  Negative for difficulty urinating, dysuria and frequency  Musculoskeletal: Negative for myalgias         Current Outpatient Medications on File Prior to Visit   Medication Sig   • clomiPHENE (CLOMID) 50 mg tablet Take 1 tablet (50 mg total) by mouth daily (Patient not taking: No sig reported)   • clotrimazole-betamethasone (LOTRISONE) 1-0 05 % cream Apply topically 2 (two) times a day (Patient not taking: No sig reported)   • ketoconazole (NIZORAL) 2 % cream Apply topically daily To lower back skin rash (Patient not taking: Reported on 11/7/2022)   • linaCLOtide 72 MCG CAPS Take 1 caplet by mouth daily as needed (constipation) (Patient not taking: Reported on 11/7/2022)   • meloxicam (MOBIC) 15 mg tablet Take 1 tablet (15 mg total) by mouth daily as needed (back pain) (Patient not taking: Reported on 11/7/2022)   • methocarbamol (ROBAXIN) 500 mg tablet Take 1 tablet (500 mg total) by mouth daily as needed for muscle spasms (back pain) (Patient not taking: Reported on 11/7/2022)   • neomycin-bacitracin-polymyxin b (NEOSPORIN) ointment Apply topically 2 (two) times a day (Patient not taking: No sig reported)   • pantoprazole (PROTONIX) 40 mg tablet Take 40 mg by mouth (Patient not taking: No sig reported)   • tadalafil (CIALIS) 2 5 MG tablet Take 1 tablet (2 5 mg total) by mouth daily as needed for erectile dysfunction (Patient not taking: No sig reported)   • traZODone (DESYREL) 50 mg tablet Take 1 tablet (50 mg total) by mouth daily at bedtime as needed for sleep (Patient not taking: No sig reported)   • valACYclovir (VALTREX) 1,000 mg tablet Take 1 tablet (1,000 mg total) by mouth 2 (two) times a day for 10 days (Patient not taking: Reported on 3/4/2022 )       Objective     /68 (BP Location: Left arm, Patient Position: Sitting, Cuff Size: Standard)   Pulse 57   Temp 97 5 °F (36 4 °C) (Temporal)   Ht 5' 6" (1 676 m)   Wt 72 3 kg (159 lb 8 oz)   SpO2 98%   BMI 25 74 kg/m²     Physical Exam  Vitals and nursing note reviewed  Constitutional:       Appearance: Normal appearance  He is well-developed  He is not ill-appearing  HENT:      Head: Normocephalic and atraumatic  Eyes:      Extraocular Movements: Extraocular movements intact  Conjunctiva/sclera: Conjunctivae normal       Pupils: Pupils are equal    Cardiovascular:      Rate and Rhythm: Normal rate and regular rhythm  Heart sounds: S1 normal and S2 normal  No murmur heard  Pulmonary:      Effort: Pulmonary effort is normal  No respiratory distress  Breath sounds: Normal breath sounds  Neurological:      Mental Status: He is alert and oriented to person, place, and time  Psychiatric:         Mood and Affect: Mood normal          Behavior: Behavior normal          Thought Content:  Thought content normal          Judgment: Judgment normal        GIULIANO Hodges

## 2022-11-08 ENCOUNTER — HOSPITAL ENCOUNTER (OUTPATIENT)
Dept: ULTRASOUND IMAGING | Facility: HOSPITAL | Age: 37
Discharge: HOME | End: 2022-11-08

## 2022-11-08 ENCOUNTER — APPOINTMENT (OUTPATIENT)
Dept: LAB | Facility: HOSPITAL | Age: 37
End: 2022-11-08

## 2022-11-08 DIAGNOSIS — R10.9 FLANK PAIN: ICD-10-CM

## 2022-11-08 LAB
ALBUMIN SERPL BCP-MCNC: 3.8 G/DL (ref 3.5–5)
ALP SERPL-CCNC: 41 U/L (ref 46–116)
ALT SERPL W P-5'-P-CCNC: 36 U/L (ref 12–78)
ANION GAP SERPL CALCULATED.3IONS-SCNC: 3 MMOL/L (ref 4–13)
AST SERPL W P-5'-P-CCNC: 24 U/L (ref 5–45)
BILIRUB SERPL-MCNC: 0.53 MG/DL (ref 0.2–1)
BUN SERPL-MCNC: 14 MG/DL (ref 5–25)
CALCIUM SERPL-MCNC: 9.5 MG/DL (ref 8.3–10.1)
CHLORIDE SERPL-SCNC: 109 MMOL/L (ref 96–108)
CO2 SERPL-SCNC: 27 MMOL/L (ref 21–32)
CREAT SERPL-MCNC: 1.17 MG/DL (ref 0.6–1.3)
GFR SERPL CREATININE-BSD FRML MDRD: 80 ML/MIN/1.73SQ M
GLUCOSE SERPL-MCNC: 87 MG/DL (ref 65–140)
POTASSIUM SERPL-SCNC: 4.3 MMOL/L (ref 3.5–5.3)
PROT SERPL-MCNC: 7.6 G/DL (ref 6.4–8.4)
SODIUM SERPL-SCNC: 139 MMOL/L (ref 135–147)

## 2022-11-14 ENCOUNTER — TELEPHONE (OUTPATIENT)
Dept: FAMILY MEDICINE CLINIC | Facility: CLINIC | Age: 37
End: 2022-11-14

## 2022-11-14 NOTE — TELEPHONE ENCOUNTER
Patient called would like his lab results reviewed and a call back with his results, please call patient regarding this matter

## 2022-12-21 PROBLEM — K60.2 ANAL FISSURE AND FISTULA: Status: ACTIVE | Noted: 2022-12-21

## 2022-12-21 PROBLEM — K60.30 ANAL FISSURE AND FISTULA: Status: ACTIVE | Noted: 2022-12-21

## 2022-12-21 PROBLEM — K60.3 ANAL FISSURE AND FISTULA: Status: ACTIVE | Noted: 2022-12-21

## 2023-05-17 ENCOUNTER — OFFICE VISIT (OUTPATIENT)
Dept: FAMILY MEDICINE CLINIC | Facility: CLINIC | Age: 38
End: 2023-05-17

## 2023-05-17 VITALS
HEIGHT: 66 IN | HEART RATE: 50 BPM | WEIGHT: 164.2 LBS | SYSTOLIC BLOOD PRESSURE: 128 MMHG | DIASTOLIC BLOOD PRESSURE: 74 MMHG | TEMPERATURE: 97.8 F | RESPIRATION RATE: 16 BRPM | BODY MASS INDEX: 26.39 KG/M2

## 2023-05-17 DIAGNOSIS — R31.29 MICROHEMATURIA: ICD-10-CM

## 2023-05-17 DIAGNOSIS — Z13.29 SCREENING FOR THYROID DISORDER: ICD-10-CM

## 2023-05-17 DIAGNOSIS — Z11.3 SCREEN FOR STD (SEXUALLY TRANSMITTED DISEASE): ICD-10-CM

## 2023-05-17 DIAGNOSIS — B35.9 TINEA: ICD-10-CM

## 2023-05-17 DIAGNOSIS — Z13.0 SCREENING FOR DEFICIENCY ANEMIA: ICD-10-CM

## 2023-05-17 DIAGNOSIS — E55.9 VITAMIN D DEFICIENCY: Primary | ICD-10-CM

## 2023-05-17 DIAGNOSIS — J34.89 NASAL PAIN: ICD-10-CM

## 2023-05-17 DIAGNOSIS — Z13.6 SCREENING FOR CARDIOVASCULAR CONDITION: ICD-10-CM

## 2023-05-17 DIAGNOSIS — A60.01 HERPES SIMPLEX INFECTION OF PENIS: ICD-10-CM

## 2023-05-17 RX ORDER — CLOTRIMAZOLE AND BETAMETHASONE DIPROPIONATE 10; .64 MG/G; MG/G
CREAM TOPICAL 2 TIMES DAILY
Qty: 30 G | Refills: 0 | Status: SHIPPED | OUTPATIENT
Start: 2023-05-17

## 2023-05-17 RX ORDER — KETOCONAZOLE 20 MG/ML
1 SHAMPOO TOPICAL 2 TIMES WEEKLY
Qty: 1 ML | Refills: 5 | Status: SHIPPED | OUTPATIENT
Start: 2023-05-18

## 2023-05-17 RX ORDER — VALACYCLOVIR HYDROCHLORIDE 1 G/1
1000 TABLET, FILM COATED ORAL 2 TIMES DAILY
Qty: 20 TABLET | Refills: 5 | Status: SHIPPED | OUTPATIENT
Start: 2023-05-17 | End: 2023-05-27

## 2023-05-17 NOTE — PROGRESS NOTES
Name: Kylee Rodney      : 1985      MRN: [id number]  Encounter Provider: GIULIANO Villatoro  Encounter Date: 2023   Encounter department: [facility] PRIMARY CARE    Assessment & Plan     1  Vitamin D deficiency  Assessment & Plan:  Has not been on supplement due for labs    Orders:  -     Vitamin D 25 hydroxy; Future    2  Herpes simplex infection of penis  Assessment & Plan:  Valtrex renewed     Orders:  -     valACYclovir (VALTREX) 1,000 mg tablet; Take 1 tablet (1,000 mg total) by mouth 2 (two) times a day for 10 days    3  Tinea  Assessment & Plan:  Shampoo for scalp and cream for lesion on left forearm     Orders:  -     clotrimazole-betamethasone (LOTRISONE) 1-0 05 % cream; Apply topically 2 (two) times a day  -     ketoconazole (NIZORAL) 2 % shampoo; Apply 1 application  topically 2 (two) times a week    4  Nasal pain  Assessment & Plan:  Has excoriated nares  Recommend using saline  Orders:  -     sodium chloride (OCEAN) 0 65 % nasal spray; 1 spray into each nostril as needed (nasal irriation)    5  Screen for STD (sexually transmitted disease)  -     : HIV 1/2 AB/AG w Reflex [facility] for 2 yr old and above; Future  -     Chlamydia/GC amplified DNA by PCR; Future  -     RPR-Syphilis Screening (Total Syphilis IGG/IGM); Future    6  Screening for cardiovascular condition  -     Lipid panel  -     Comprehensive metabolic panel; Future    7  Microhematuria  Assessment & Plan:  UA ordered     Orders:  -     Urinalysis with microscopic    8  Screening for deficiency anemia  -     CBC and differential; Future    9  Screening for thyroid disorder  -     TSH, 3rd generation with Free T4 reflex; Future      BMI Counseling: Body mass index is 26 5 kg/m²  The BMI is above normal  Nutrition recommendations include decreasing portion sizes, moderation in carbohydrate intake, reducing intake of saturated and trans fat and reducing intake of cholesterol   Exercise recommendations include "moderate physical activity 150 minutes/week and strength training exercises  Rationale for BMI follow-up plan is due to patient being overweight or obese  Depression Screening and Follow-up Plan: Patient was screened for depression during today's encounter  They screened negative with a PHQ-2 score of 0  Subjective      Patient presents today for general follow up  He would like to get blood work done  He also states he needs refills on his mediations valtrex  He also reports he has rash on his neck  Spouse noticed discoloration  Sometime it is itchy  Has not tried anything for this  Review of Systems   Constitutional: Negative for diaphoresis, fatigue and fever  Respiratory: Negative for cough and shortness of breath  Cardiovascular: Negative for chest pain  Gastrointestinal: Negative for abdominal pain and vomiting  Genitourinary: Positive for genital sores  Skin: Positive for rash  All other systems reviewed and are negative  Current Outpatient Medications on File Prior to Visit   Medication Sig   • meloxicam (MOBIC) 15 mg tablet Take 1 tablet (15 mg total) by mouth daily as needed (back pain) (Patient not taking: Reported on 11/7/2022)   • methocarbamol (ROBAXIN) 500 mg tablet Take 1 tablet (500 mg total) by mouth daily as needed for muscle spasms (back pain) (Patient not taking: Reported on 11/7/2022)   • pantoprazole (PROTONIX) 40 mg tablet Take 40 mg by mouth (Patient not taking: Reported on 8/19/2021)       Objective     /74 (BP Location: Right arm, Patient Position: Sitting, Cuff Size: Adult)   Pulse (!) 50   Temp 97 8 °F (36 6 °C) (Temporal)   Resp 16   Ht 5' 6\" (1 676 m)   Wt 74 5 kg (164 lb 3 2 oz)   BMI 26 50 kg/m²     Physical Exam  Vitals and nursing note reviewed  Constitutional:       Appearance: Normal appearance  He is well-developed  He is not ill-appearing  HENT:      Head: Normocephalic and atraumatic     Eyes:      Extraocular Movements: " Extraocular movements intact  Conjunctiva/sclera: Conjunctivae normal       Pupils: Pupils are equal    Cardiovascular:      Rate and Rhythm: Normal rate and regular rhythm  Heart sounds: S1 normal and S2 normal  No murmur heard  Pulmonary:      Effort: Pulmonary effort is normal  No respiratory distress  Breath sounds: Normal breath sounds  Skin:     Findings: Rash present  Comments: hyperpigmented rash to posterior neck cosistetn with tinea of the scalp   Neurological:      Mental Status: He is alert and oriented to person, place, and time  Psychiatric:         Mood and Affect: Mood normal          Thought Content:  Thought content normal        John Solomon

## 2023-05-19 PROBLEM — B35.9 TINEA: Status: ACTIVE | Noted: 2023-05-19

## 2023-05-19 PROBLEM — J34.89 NASAL PAIN: Status: ACTIVE | Noted: 2023-05-19

## 2023-09-22 ENCOUNTER — OFFICE VISIT (OUTPATIENT)
Dept: FAMILY MEDICINE CLINIC | Facility: CLINIC | Age: 38
End: 2023-09-22
Payer: COMMERCIAL

## 2023-09-22 VITALS
TEMPERATURE: 98.3 F | DIASTOLIC BLOOD PRESSURE: 68 MMHG | SYSTOLIC BLOOD PRESSURE: 104 MMHG | HEART RATE: 54 BPM | WEIGHT: 168.5 LBS | BODY MASS INDEX: 27.08 KG/M2 | OXYGEN SATURATION: 97 % | HEIGHT: 66 IN

## 2023-09-22 DIAGNOSIS — R51.9 WORSENING HEADACHES: ICD-10-CM

## 2023-09-22 DIAGNOSIS — H53.9 EPISODE OF VISUAL DISTURBANCE: ICD-10-CM

## 2023-09-22 DIAGNOSIS — B35.9 TINEA: ICD-10-CM

## 2023-09-22 DIAGNOSIS — Z00.00 ANNUAL PHYSICAL EXAM: Primary | ICD-10-CM

## 2023-09-22 PROCEDURE — 99395 PREV VISIT EST AGE 18-39: CPT | Performed by: NURSE PRACTITIONER

## 2023-09-22 RX ORDER — KETOCONAZOLE 20 MG/ML
1 SHAMPOO TOPICAL 2 TIMES WEEKLY
Qty: 1 ML | Refills: 5 | Status: SHIPPED | OUTPATIENT
Start: 2023-09-25

## 2023-09-22 RX ORDER — CLOTRIMAZOLE AND BETAMETHASONE DIPROPIONATE 10; .64 MG/G; MG/G
CREAM TOPICAL 2 TIMES DAILY
Qty: 30 G | Refills: 5 | Status: SHIPPED | OUTPATIENT
Start: 2023-09-22

## 2023-09-22 NOTE — PROGRESS NOTES
ADULT ANNUAL [address] PRIMARY CARE    NAME: Janes Sheikh  AGE: 30 y.o. SEX: male  : 1985     DATE: 2023     Assessment and Plan:     Problem List Items Addressed This Visit        Musculoskeletal and Integument    Tinea    Relevant Medications    clotrimazole-betamethasone (LOTRISONE) 1-0.05 % cream    ketoconazole (NIZORAL) 2 % shampoo (Start on 2023)       Other    Annual physical exam - Primary    Relevant Orders    Ambulatory Referral to Ophthalmology   Other Visit Diagnoses     Episode of visual disturbance        Relevant Orders    CT head wo contrast    Worsening headaches        ct head ordered     Relevant Orders    CT head wo contrast          Immunizations and preventive care screenings were discussed with patient today. Appropriate education was printed on patient's after visit summary. Discussed risks and benefits of prostate cancer screening. We discussed the controversial history of PSA screening for prostate cancer in the [facility] as well as the risk of over detection and over treatment of prostate cancer by way of PSA screening. The patient understands that PSA blood testing is an imperfect way to screen for prostate cancer and that elevated PSA levels in the blood may also be caused by infection, inflammation, prostatic trauma or manipulation, urological procedures, or by benign prostatic enlargement. The role of the digital rectal examination in prostate cancer screening was also discussed and I discussed with him that there is large interobserver variability in the findings of digital rectal examination. Counseling:  Alcohol/drug use: discussed moderation in alcohol intake, the recommendations for healthy alcohol use, and avoidance of illicit drug use. Dental Health: discussed importance of regular tooth brushing, flossing, and dental visits.   Injury prevention: discussed safety/seat belts, safety helmets, smoke detectors, carbon dioxide detectors, and smoking near bedding or upholstery. Sexual health: discussed sexually transmitted diseases, partner selection, use of condoms, avoidance of unintended pregnancy, and contraceptive alternatives. Exercise: the importance of regular exercise/physical activity was discussed. Recommend exercise 3-5 times per week for at least 30 minutes. Return in about 1 year (around 9/22/2024) for Annual exam PE. Chief Complaint:     Chief Complaint   Patient presents with   • Physical Exam     Annual PE      History of Present Illness:     Adult Annual Physical   Patient here for a comprehensive physical exam. The patient reports no problems. Patient would like refill on his creams for his fungal rash  He also notes he been having increasing headaches better when he lays down but worse when standing. Taking otc medication not helpful. He has history of  Multiple concussions. With these episodes he also experiencing visual disturbance a well       Diet and Physical Activity  Diet/Nutrition: well balanced diet. Exercise: no formal exercise. Depression Screening  PHQ-2/9 Depression Screening         General Health  Sleep: sleeps well. Hearing: normal - bilateral.  Vision: most recent eye exam >1 year ago. Dental: no dental visits for >1 year and brushes teeth twice daily.  Health  Symptoms include: none     Review of Systems:     Review of Systems   Constitutional: Negative for activity change. Respiratory: Negative. Cardiovascular: Negative. Gastrointestinal: Negative. Neurological: Positive for headaches. Psychiatric/Behavioral: Negative. Past Medical History:     Past Medical History:   Diagnosis Date   • Dizziness     hx head injury when child-gets dizzy and headaches      Past Surgical History:     History reviewed. No pertinent surgical history.    Family History:     Family History   Problem Relation Age of Onset   • No Known Problems Mother    • Hypertension Father    • Hypertension Maternal Grandmother    • Vision loss Maternal Grandfather    • No Known Problems Paternal Grandmother    • No Known Problems Paternal Grandfather       Social History:     Social History     Socioeconomic History   • Marital status: /Civil Union     Spouse name: None   • Number of children: None   • Years of education: None   • Highest education level: None   Occupational History   • None   Tobacco Use   • Smoking status: Never   • Smokeless tobacco: Never   Substance and Sexual Activity   • Alcohol use: Yes     Comment: social   • Drug use: No   • Sexual activity: Yes     Partners: Female   Other Topics Concern   • None   Social History Narrative    Lives with wife and daughter    Full time  Employment    apartment     Social Determinants of Health     Financial Resource Strain: Not on file   Food Insecurity: Not on file   Transportation Needs: Not on file   Physical Activity: Not on file   Stress: Not on file   Social Connections: Not on file   Intimate Partner Violence: Not on file   Housing Stability: Not on file      Current Medications:     Current Outpatient Medications   Medication Sig Dispense Refill   • clotrimazole-betamethasone (LOTRISONE) 1-0.05 % cream Apply topically 2 (two) times a day 30 g 5   • [START ON 9/25/2023] ketoconazole (NIZORAL) 2 % shampoo Apply 1 Application topically 2 (two) times a week 1 mL 5   • meloxicam (MOBIC) 15 mg tablet Take 1 tablet (15 mg total) by mouth daily as needed (back pain) (Patient not taking: Reported on 11/7/2022) 30 tablet 3   • methocarbamol (ROBAXIN) 500 mg tablet Take 1 tablet (500 mg total) by mouth daily as needed for muscle spasms (back pain) (Patient not taking: Reported on 11/7/2022) 30 tablet 3   • pantoprazole (PROTONIX) 40 mg tablet Take 40 mg by mouth (Patient not taking: Reported on 8/19/2021)     • sodium chloride (OCEAN) 0.65 % nasal spray 1 spray into each nostril as needed (nasal irriation) (Patient not taking: Reported on 9/22/2023) 30 mL 5   • valACYclovir (VALTREX) 1,000 mg tablet Take 1 tablet (1,000 mg total) by mouth 2 (two) times a day for 10 days 20 tablet 5     No current facility-administered medications for this visit. Allergies:     No Known Allergies   Physical Exam:     /68 (BP Location: Left arm, Patient Position: Sitting, Cuff Size: Standard)   Pulse (!) 54   Temp 98.3 °F (36.8 °C) (Temporal)   Ht 5' 6" (1.676 m)   Wt 76.4 kg (168 lb 8 oz)   SpO2 97%   BMI 27.20 kg/m²     Physical Exam  Vitals and nursing note reviewed. Constitutional:       General: He is not in acute distress. Appearance: He is well-developed and normal weight. He is not ill-appearing, toxic-appearing or diaphoretic. HENT:      Head: Normocephalic and atraumatic. Right Ear: Tympanic membrane and external ear normal.      Left Ear: Tympanic membrane and external ear normal.      Nose: Nose normal.      Mouth/Throat:      Mouth: Mucous membranes are moist.      Pharynx: Uvula midline. No oropharyngeal exudate or posterior oropharyngeal erythema. Eyes:      General: No scleral icterus. Extraocular Movements: Extraocular movements intact. Conjunctiva/sclera: Conjunctivae normal.      Pupils: Pupils are equal, round, and reactive to light. Neck:      Thyroid: No thyromegaly. Vascular: No carotid bruit or JVD. Cardiovascular:      Rate and Rhythm: Normal rate and regular rhythm. Pulses:           Carotid pulses are 2+ on the right side and 2+ on the left side. Heart sounds: Normal heart sounds. Pulmonary:      Effort: Pulmonary effort is normal. No respiratory distress. Breath sounds: Normal breath sounds. Abdominal:      General: Bowel sounds are normal. There is no distension. Palpations: Abdomen is soft. Tenderness: There is no abdominal tenderness. Musculoskeletal:         General: Normal range of motion.       Cervical back: Normal range of motion. Right lower leg: No edema. Left lower leg: No edema. Lymphadenopathy:      Cervical: No cervical adenopathy. Skin:     General: Skin is warm and dry. Capillary Refill: Capillary refill takes less than 2 seconds. Neurological:      Mental Status: He is alert and oriented to person, place, and time. Motor: Motor function is intact. Gait: Gait is intact. Gait normal.   Psychiatric:         Attention and Perception: Attention normal.         Mood and Affect: Mood normal.         Speech: Speech normal.         Behavior: Behavior normal. Behavior is cooperative. Thought Content:  Thought content normal.          GIULIANO Plata  [facility] PRIMARY CARE

## 2023-11-02 ENCOUNTER — APPOINTMENT (OUTPATIENT)
Dept: LAB | Facility: HOSPITAL | Age: 38
End: 2023-11-02
Payer: COMMERCIAL

## 2023-11-02 DIAGNOSIS — Z13.6 SCREENING FOR CARDIOVASCULAR CONDITION: ICD-10-CM

## 2023-11-02 DIAGNOSIS — Z11.3 SCREEN FOR STD (SEXUALLY TRANSMITTED DISEASE): ICD-10-CM

## 2023-11-02 DIAGNOSIS — E55.9 VITAMIN D DEFICIENCY: ICD-10-CM

## 2023-11-02 DIAGNOSIS — Z13.0 SCREENING FOR DEFICIENCY ANEMIA: ICD-10-CM

## 2023-11-02 DIAGNOSIS — Z13.29 SCREENING FOR THYROID DISORDER: ICD-10-CM

## 2023-11-02 LAB
25(OH)D3 SERPL-MCNC: 20.9 NG/ML (ref 30–100)
ALBUMIN SERPL BCP-MCNC: 4.5 G/DL (ref 3.5–5)
ALP SERPL-CCNC: 34 U/L (ref 34–104)
ALT SERPL W P-5'-P-CCNC: 26 U/L (ref 7–52)
ANION GAP SERPL CALCULATED.3IONS-SCNC: 3 MMOL/L
AST SERPL W P-5'-P-CCNC: 24 U/L (ref 13–39)
BACTERIA UR QL AUTO: ABNORMAL /HPF
BASOPHILS # BLD AUTO: 0.03 THOUSANDS/ÂΜL (ref 0–0.1)
BASOPHILS NFR BLD AUTO: 1 % (ref 0–1)
BILIRUB SERPL-MCNC: 0.56 MG/DL (ref 0.2–1)
BILIRUB UR QL STRIP: NEGATIVE
BUN SERPL-MCNC: 17 MG/DL (ref 5–25)
CALCIUM SERPL-MCNC: 9.6 MG/DL (ref 8.4–10.2)
CHLORIDE SERPL-SCNC: 106 MMOL/L (ref 96–108)
CHOLEST SERPL-MCNC: 194 MG/DL
CLARITY UR: CLEAR
CO2 SERPL-SCNC: 30 MMOL/L (ref 21–32)
COLOR UR: ABNORMAL
CREAT SERPL-MCNC: 1.09 MG/DL (ref 0.6–1.3)
EOSINOPHIL # BLD AUTO: 0.16 THOUSAND/ÂΜL (ref 0–0.61)
EOSINOPHIL NFR BLD AUTO: 3 % (ref 0–6)
ERYTHROCYTE [DISTWIDTH] IN BLOOD BY AUTOMATED COUNT: 13.1 % (ref 11.6–15.1)
GFR SERPL CREATININE-BSD FRML MDRD: 86 ML/MIN/1.73SQ M
GLUCOSE P FAST SERPL-MCNC: 92 MG/DL (ref 65–99)
GLUCOSE UR STRIP-MCNC: NEGATIVE MG/DL
HCT VFR BLD AUTO: 47.8 % (ref 36.5–49.3)
HDLC SERPL-MCNC: 50 MG/DL
HGB BLD-MCNC: 15.3 G/DL (ref 12–17)
HGB UR QL STRIP.AUTO: NEGATIVE
HIV 1+2 AB+HIV1 P24 AG SERPL QL IA: NORMAL
HIV 2 AB SERPL QL IA: NORMAL
HIV1 AB SERPL QL IA: NORMAL
HIV1 P24 AG SERPL QL IA: NORMAL
IMM GRANULOCYTES # BLD AUTO: 0.02 THOUSAND/UL (ref 0–0.2)
IMM GRANULOCYTES NFR BLD AUTO: 0 % (ref 0–2)
KETONES UR STRIP-MCNC: NEGATIVE MG/DL
LDLC SERPL CALC-MCNC: 126 MG/DL (ref 0–100)
LEUKOCYTE ESTERASE UR QL STRIP: NEGATIVE
LYMPHOCYTES # BLD AUTO: 1.83 THOUSANDS/ÂΜL (ref 0.6–4.47)
LYMPHOCYTES NFR BLD AUTO: 36 % (ref 14–44)
MCH RBC QN AUTO: 28.4 PG (ref 26.8–34.3)
MCHC RBC AUTO-ENTMCNC: 32 G/DL (ref 31.4–37.4)
MCV RBC AUTO: 89 FL (ref 82–98)
MONOCYTES # BLD AUTO: 0.36 THOUSAND/ÂΜL (ref 0.17–1.22)
MONOCYTES NFR BLD AUTO: 7 % (ref 4–12)
MUCOUS THREADS UR QL AUTO: ABNORMAL
NEUTROPHILS # BLD AUTO: 2.72 THOUSANDS/ÂΜL (ref 1.85–7.62)
NEUTS SEG NFR BLD AUTO: 53 % (ref 43–75)
NITRITE UR QL STRIP: NEGATIVE
NON-SQ EPI CELLS URNS QL MICRO: ABNORMAL /HPF
NONHDLC SERPL-MCNC: 144 MG/DL
NRBC BLD AUTO-RTO: 0 /100 WBCS
PH UR STRIP.AUTO: 5.5 [PH]
PLATELET # BLD AUTO: 211 THOUSANDS/UL (ref 149–390)
PMV BLD AUTO: 10.4 FL (ref 8.9–12.7)
POTASSIUM SERPL-SCNC: 4.3 MMOL/L (ref 3.5–5.3)
PROT SERPL-MCNC: 7.1 G/DL (ref 6.4–8.4)
PROT UR STRIP-MCNC: NEGATIVE MG/DL
RBC # BLD AUTO: 5.39 MILLION/UL (ref 3.88–5.62)
RBC #/AREA URNS AUTO: ABNORMAL /HPF
SODIUM SERPL-SCNC: 139 MMOL/L (ref 135–147)
SP GR UR STRIP.AUTO: 1.02 (ref 1–1.03)
TREPONEMA PALLIDUM IGG+IGM AB [PRESENCE] IN SERUM OR PLASMA BY IMMUNOASSAY: NORMAL
TRIGL SERPL-MCNC: 89 MG/DL
TSH SERPL DL<=0.05 MIU/L-ACNC: 0.89 UIU/ML (ref 0.45–4.5)
UROBILINOGEN UR STRIP-ACNC: <2 MG/DL
WBC # BLD AUTO: 5.12 THOUSAND/UL (ref 4.31–10.16)
WBC #/AREA URNS AUTO: ABNORMAL /HPF

## 2023-11-02 PROCEDURE — 80053 COMPREHEN METABOLIC PANEL: CPT

## 2023-11-02 PROCEDURE — 84443 ASSAY THYROID STIM HORMONE: CPT

## 2023-11-02 PROCEDURE — 85025 COMPLETE CBC W/AUTO DIFF WBC: CPT

## 2023-11-02 PROCEDURE — 87389 HIV-1 AG W/HIV-1&-2 AB AG IA: CPT

## 2023-11-02 PROCEDURE — 87491 CHLMYD TRACH DNA AMP PROBE: CPT

## 2023-11-02 PROCEDURE — 87591 N.GONORRHOEAE DNA AMP PROB: CPT

## 2023-11-02 PROCEDURE — 86780 TREPONEMA PALLIDUM: CPT

## 2023-11-02 PROCEDURE — 82306 VITAMIN D 25 HYDROXY: CPT

## 2023-11-02 PROCEDURE — 36415 COLL VENOUS BLD VENIPUNCTURE: CPT

## 2023-11-03 LAB
C TRACH DNA SPEC QL NAA+PROBE: NEGATIVE
N GONORRHOEA DNA SPEC QL NAA+PROBE: NEGATIVE

## 2023-12-06 ENCOUNTER — TELEPHONE (OUTPATIENT)
Dept: FAMILY MEDICINE CLINIC | Facility: CLINIC | Age: 38
End: 2023-12-06

## 2023-12-06 DIAGNOSIS — A60.01 HERPES SIMPLEX INFECTION OF PENIS: ICD-10-CM

## 2023-12-06 NOTE — TELEPHONE ENCOUNTER
Patient called into the office requesting a refill on the medication Valacyclovir. Patient would like it to be sent to [facility] pharmacy on [address]. Please advise. Thank You. Debridement Text: The wound edges were debrided prior to proceeding with the closure to facilitate wound healing.

## 2023-12-07 RX ORDER — VALACYCLOVIR HYDROCHLORIDE 1 G/1
1000 TABLET, FILM COATED ORAL 2 TIMES DAILY
Qty: 20 TABLET | Refills: 5 | Status: SHIPPED | OUTPATIENT
Start: 2023-12-07 | End: 2023-12-17

## 2024-03-12 ENCOUNTER — TELEPHONE (OUTPATIENT)
Age: 39
End: 2024-03-12

## 2024-03-12 DIAGNOSIS — A60.01 HERPES SIMPLEX INFECTION OF PENIS: ICD-10-CM

## 2024-03-12 NOTE — TELEPHONE ENCOUNTER
Patient called in requesting to have Valacyclovir refilled . Patient was last seen 12/06/23 but is scheduled for 03/18 with Vanessa Downs. Please advise .

## 2024-03-13 RX ORDER — VALACYCLOVIR HYDROCHLORIDE 1 G/1
1000 TABLET, FILM COATED ORAL 2 TIMES DAILY
Qty: 20 TABLET | Refills: 5 | Status: SHIPPED | OUTPATIENT
Start: 2024-03-13 | End: 2024-03-23

## 2024-07-17 ENCOUNTER — OFFICE VISIT (OUTPATIENT)
Dept: FAMILY MEDICINE CLINIC | Facility: CLINIC | Age: 39
End: 2024-07-17
Payer: COMMERCIAL

## 2024-07-17 VITALS
DIASTOLIC BLOOD PRESSURE: 78 MMHG | BODY MASS INDEX: 26.42 KG/M2 | WEIGHT: 164.4 LBS | HEIGHT: 66 IN | SYSTOLIC BLOOD PRESSURE: 110 MMHG | HEART RATE: 57 BPM | TEMPERATURE: 97.6 F | OXYGEN SATURATION: 99 %

## 2024-07-17 DIAGNOSIS — M43.06 LUMBAR PARS DEFECT: ICD-10-CM

## 2024-07-17 DIAGNOSIS — M54.9 LEFT-SIDED BACK PAIN, UNSPECIFIED BACK LOCATION, UNSPECIFIED CHRONICITY: Primary | ICD-10-CM

## 2024-07-17 DIAGNOSIS — J34.89 NASAL PAIN: ICD-10-CM

## 2024-07-17 LAB
SL AMB  POCT GLUCOSE, UA: NEGATIVE
SL AMB LEUKOCYTE ESTERASE,UA: NEGATIVE
SL AMB POCT BILIRUBIN,UA: NEGATIVE
SL AMB POCT BLOOD,UA: NEGATIVE
SL AMB POCT CLARITY,UA: CLEAR
SL AMB POCT COLOR,UA: YELLOW
SL AMB POCT KETONES,UA: NEGATIVE
SL AMB POCT NITRITE,UA: NEGATIVE
SL AMB POCT PH,UA: 7
SL AMB POCT SPECIFIC GRAVITY,UA: 1.02
SL AMB POCT URINE PROTEIN: NEGATIVE
SL AMB POCT UROBILINOGEN: 0.2

## 2024-07-17 PROCEDURE — 87086 URINE CULTURE/COLONY COUNT: CPT | Performed by: NURSE PRACTITIONER

## 2024-07-17 PROCEDURE — 99214 OFFICE O/P EST MOD 30 MIN: CPT | Performed by: NURSE PRACTITIONER

## 2024-07-17 PROCEDURE — 81002 URINALYSIS NONAUTO W/O SCOPE: CPT | Performed by: NURSE PRACTITIONER

## 2024-07-17 RX ORDER — CYCLOBENZAPRINE HCL 10 MG
10 TABLET ORAL
Qty: 90 TABLET | Refills: 0 | Status: SHIPPED | OUTPATIENT
Start: 2024-07-17

## 2024-07-18 LAB — BACTERIA UR CULT: NORMAL

## 2024-08-22 DIAGNOSIS — A60.01 HERPES SIMPLEX INFECTION OF PENIS: ICD-10-CM

## 2024-08-22 RX ORDER — VALACYCLOVIR HYDROCHLORIDE 1 G/1
1000 TABLET, FILM COATED ORAL 2 TIMES DAILY
Qty: 20 TABLET | Refills: 5 | Status: SHIPPED | OUTPATIENT
Start: 2024-08-22 | End: 2025-08-21

## 2024-08-22 NOTE — TELEPHONE ENCOUNTER
Reason for call:   [x] Refill   [] Prior Auth  [] Other:     Office:   [x] PCP/Provider - GIULIANO Nelson /[facility]  [] Specialty/Provider -     Medication: valACYclovir (VALTREX) 1,000 mg tablet      Dose/Frequency: Take 1 tablet (1,000 mg total) by mouth 2 (two) times a day for 10 days,     Quantity: 20    Pharmacy:   [id number] - [place] - [address]        Does the patient have enough for 3 days?   [] Yes   [x] No - Send as HP to POD

## 2024-10-30 ENCOUNTER — OFFICE VISIT (OUTPATIENT)
Dept: FAMILY MEDICINE CLINIC | Facility: CLINIC | Age: 39
End: 2024-10-30
Payer: COMMERCIAL

## 2024-10-30 VITALS
WEIGHT: 164.8 LBS | RESPIRATION RATE: 16 BRPM | HEIGHT: 66 IN | DIASTOLIC BLOOD PRESSURE: 76 MMHG | OXYGEN SATURATION: 98 % | BODY MASS INDEX: 26.48 KG/M2 | HEART RATE: 74 BPM | TEMPERATURE: 97.8 F | SYSTOLIC BLOOD PRESSURE: 122 MMHG

## 2024-10-30 DIAGNOSIS — E55.9 VITAMIN D DEFICIENCY: ICD-10-CM

## 2024-10-30 DIAGNOSIS — J34.89 NASAL PAIN: ICD-10-CM

## 2024-10-30 DIAGNOSIS — N52.9 ERECTILE DYSFUNCTION, UNSPECIFIED ERECTILE DYSFUNCTION TYPE: ICD-10-CM

## 2024-10-30 DIAGNOSIS — Z00.00 ANNUAL PHYSICAL EXAM: Primary | ICD-10-CM

## 2024-10-30 DIAGNOSIS — Z11.3 SCREEN FOR STD (SEXUALLY TRANSMITTED DISEASE): ICD-10-CM

## 2024-10-30 DIAGNOSIS — I51.7 LVH (LEFT VENTRICULAR HYPERTROPHY): ICD-10-CM

## 2024-10-30 PROCEDURE — 99395 PREV VISIT EST AGE 18-39: CPT | Performed by: NURSE PRACTITIONER

## 2024-10-30 PROCEDURE — 93000 ELECTROCARDIOGRAM COMPLETE: CPT | Performed by: NURSE PRACTITIONER

## 2024-10-30 NOTE — ASSESSMENT & PLAN NOTE
Routine health labs ordered   Orders:    Lipid Panel with Direct LDL reflex; Future    CBC and differential; Future    TSH, 3rd generation with Free T4 reflex; Future    Comprehensive metabolic panel    Vitamin B12    Iron Panel (Includes Ferritin, Iron Sat%, Iron, and TIBC); Future

## 2024-10-30 NOTE — ASSESSMENT & PLAN NOTE
Continue use of ocean nasal spray  Orders:    sodium chloride (OCEAN) 0.65 % nasal spray; 1 spray into each nostril as needed (nasal irriation)

## 2024-10-30 NOTE — ASSESSMENT & PLAN NOTE
EKG today was normal and unchanged   Blood pressure is normal  Previous echo normal   Orders:    POCT ECG

## 2024-10-30 NOTE — PROGRESS NOTES
Adult Annual Physical  Name: Stephane Sheikh      : 1985      MRN: [id number]  Encounter Provider: GIULIANO Plata  Encounter Date: 10/30/2024   Encounter department: [facility] PRIMARY CARE    Assessment & Plan  Annual physical exam  Routine health labs ordered   Orders:    Lipid Panel with Direct LDL reflex; Future    CBC and differential; Future    TSH, 3rd generation with Free T4 reflex; Future    Comprehensive metabolic panel    Vitamin B12    Iron Panel (Includes Ferritin, Iron Sat%, Iron, and TIBC); Future    Erectile dysfunction, unspecified erectile dysfunction type    Orders:    Testosterone, free, total; Future    LVH (left ventricular hypertrophy)  EKG today was normal and unchanged   Blood pressure is normal  Previous echo normal   Orders:    POCT ECG    Nasal pain  Continue use of ocean nasal spray  Orders:    sodium chloride (OCEAN) 0.65 % nasal spray; 1 spray into each nostril as needed (nasal irriation)    Vitamin D deficiency    Orders:    Vitamin D 25 hydroxy    Screen for STD (sexually transmitted disease)    Orders:    HIV 1/2 AG/AB w Reflex [facility] for 2 yr old and above; Future    Chlamydia/GC amplified DNA by PCR; Future    RPR-Syphilis Screening (Total Syphilis IGG/IGM); Future    Immunizations and preventive care screenings were discussed with patient today. Appropriate education was printed on patient's after visit summary.    Counseling:  Alcohol/drug use: discussed moderation in alcohol intake, the recommendations for healthy alcohol use, and avoidance of illicit drug use.  Dental Health: discussed importance of regular tooth brushing, flossing, and dental visits.  Injury prevention: discussed safety/seat belts, safety helmets, smoke detectors, carbon monoxide detectors, and smoking near bedding or upholstery.  Sexual health: discussed sexually transmitted diseases, partner selection, use of condoms, avoidance of unintended pregnancy, and contraceptive  "alternatives.  Exercise: the importance of regular exercise/physical activity was discussed. Recommend exercise 3-5 times per week for at least 30 minutes.       Depression Screening and Follow-up Plan: Patient was screened for depression during today's encounter. They screened negative with a PHQ-2 score of 0.        History of Present Illness     Adult Annual Physical:  Patient presents for annual physical. Right sided shoulder and neck pain- thinks its related to stress  Wants to have EKG   Taking magnesium, vitamin b12 and vitamin d .     Diet and Physical Activity:  - Diet/Nutrition: well balanced diet.  - Exercise: no formal exercise. stretches in the morning    Depression Screening:  - PHQ-2 Score: 0    General Health:  - Sleep: sleeps well.  - Hearing: normal hearing bilateral ears.  - Vision: vision problems. has appointment for glasses  - Dental: regular dental visits.     Health:    - Urinary symptoms: erectile dysfunction.     Review of Systems   Constitutional:  Negative for appetite change and fever.   Respiratory:  Negative for chest tightness and shortness of breath.    Cardiovascular:  Negative for chest pain, palpitations and leg swelling.   Gastrointestinal:  Negative for abdominal pain.   Genitourinary:  Negative for difficulty urinating.        ED   Musculoskeletal:  Negative for arthralgias and myalgias.        Right shoulder pain    Skin:  Negative for wound.   Neurological:  Negative for dizziness, light-headedness and headaches.   Psychiatric/Behavioral:  Negative for dysphoric mood and sleep disturbance. The patient is not nervous/anxious.         Feels stress          Objective     /76 (BP Location: Left arm, Patient Position: Sitting, Cuff Size: Adult)   Pulse 74   Temp 97.8 °F (36.6 °C) (Temporal)   Resp 16   Ht 5' 6\" (1.676 m)   Wt 74.8 kg (164 lb 12.8 oz)   SpO2 98%   BMI 26.60 kg/m²     Physical Exam  Vitals and nursing note reviewed.   Constitutional:       General: He " is not in acute distress.     Appearance: He is well-developed. He is not ill-appearing, toxic-appearing or diaphoretic.   HENT:      Head: Normocephalic and atraumatic.      Right Ear: Tympanic membrane and external ear normal.      Left Ear: Tympanic membrane and external ear normal.      Nose: Nose normal.      Mouth/Throat:      Mouth: Mucous membranes are moist.      Pharynx: Uvula midline. No oropharyngeal exudate or posterior oropharyngeal erythema.   Eyes:      General: No scleral icterus.     Conjunctiva/sclera: Conjunctivae normal.      Pupils: Pupils are equal, round, and reactive to light.   Neck:      Thyroid: No thyromegaly.      Vascular: No carotid bruit or JVD.   Cardiovascular:      Rate and Rhythm: Normal rate and regular rhythm.      Pulses:           Carotid pulses are 2+ on the right side and 2+ on the left side.     Heart sounds: Normal heart sounds.   Pulmonary:      Effort: Pulmonary effort is normal. No respiratory distress.      Breath sounds: Normal breath sounds.   Abdominal:      General: Bowel sounds are normal. There is no distension.      Palpations: Abdomen is soft.      Tenderness: There is no abdominal tenderness.   Musculoskeletal:         General: Normal range of motion.      Cervical back: Normal range of motion.      Right lower leg: No edema.      Left lower leg: No edema.   Lymphadenopathy:      Cervical: No cervical adenopathy.   Skin:     General: Skin is warm and dry.   Neurological:      Mental Status: He is alert and oriented to person, place, and time.      Motor: Motor function is intact.      Gait: Gait is intact.   Psychiatric:         Attention and Perception: Attention normal.         Mood and Affect: Mood normal.         Speech: Speech normal.         Behavior: Behavior normal. Behavior is cooperative.         Thought Content: Thought content normal.

## 2025-01-27 ENCOUNTER — APPOINTMENT (OUTPATIENT)
Dept: LAB | Facility: HOSPITAL | Age: 40
End: 2025-01-27

## 2025-01-27 DIAGNOSIS — Z00.00 ANNUAL PHYSICAL EXAM: ICD-10-CM

## 2025-01-27 DIAGNOSIS — Z11.3 SCREEN FOR STD (SEXUALLY TRANSMITTED DISEASE): ICD-10-CM

## 2025-01-27 DIAGNOSIS — M54.9 LEFT-SIDED BACK PAIN, UNSPECIFIED BACK LOCATION, UNSPECIFIED CHRONICITY: ICD-10-CM

## 2025-01-27 DIAGNOSIS — N52.9 ERECTILE DYSFUNCTION, UNSPECIFIED ERECTILE DYSFUNCTION TYPE: ICD-10-CM

## 2025-01-27 LAB
25(OH)D3 SERPL-MCNC: 20.4 NG/ML (ref 30–100)
ALBUMIN SERPL BCG-MCNC: 4.5 G/DL (ref 3.5–5)
ALP SERPL-CCNC: 41 U/L (ref 34–104)
ALT SERPL W P-5'-P-CCNC: 24 U/L (ref 7–52)
ANION GAP SERPL CALCULATED.3IONS-SCNC: 3 MMOL/L (ref 4–13)
AST SERPL W P-5'-P-CCNC: 23 U/L (ref 13–39)
BASOPHILS # BLD AUTO: 0.02 THOUSANDS/ΜL (ref 0–0.1)
BASOPHILS NFR BLD AUTO: 0 % (ref 0–1)
BILIRUB SERPL-MCNC: 0.53 MG/DL (ref 0.2–1)
BUN SERPL-MCNC: 19 MG/DL (ref 5–25)
CALCIUM SERPL-MCNC: 9.4 MG/DL (ref 8.4–10.2)
CHLORIDE SERPL-SCNC: 105 MMOL/L (ref 96–108)
CHOLEST SERPL-MCNC: 188 MG/DL (ref ?–200)
CO2 SERPL-SCNC: 31 MMOL/L (ref 21–32)
CREAT SERPL-MCNC: 1.07 MG/DL (ref 0.6–1.3)
EOSINOPHIL # BLD AUTO: 0.14 THOUSAND/ΜL (ref 0–0.61)
EOSINOPHIL NFR BLD AUTO: 3 % (ref 0–6)
ERYTHROCYTE [DISTWIDTH] IN BLOOD BY AUTOMATED COUNT: 12.9 % (ref 11.6–15.1)
FERRITIN SERPL-MCNC: 36 NG/ML (ref 24–336)
GFR SERPL CREATININE-BSD FRML MDRD: 88 ML/MIN/1.73SQ M
GLUCOSE P FAST SERPL-MCNC: 96 MG/DL (ref 65–99)
HCT VFR BLD AUTO: 46.4 % (ref 36.5–49.3)
HDLC SERPL-MCNC: 53 MG/DL
HGB BLD-MCNC: 15 G/DL (ref 12–17)
HIV 1+2 AB+HIV1 P24 AG SERPL QL IA: NORMAL
HIV 2 AB SERPL QL IA: NORMAL
HIV1 AB SERPL QL IA: NORMAL
HIV1 P24 AG SERPL QL IA: NORMAL
IMM GRANULOCYTES # BLD AUTO: 0.01 THOUSAND/UL (ref 0–0.2)
IMM GRANULOCYTES NFR BLD AUTO: 0 % (ref 0–2)
IRON SATN MFR SERPL: 31 % (ref 15–50)
IRON SERPL-MCNC: 102 UG/DL (ref 50–212)
LDLC SERPL CALC-MCNC: 121 MG/DL (ref 0–100)
LYMPHOCYTES # BLD AUTO: 1.63 THOUSANDS/ΜL (ref 0.6–4.47)
LYMPHOCYTES NFR BLD AUTO: 37 % (ref 14–44)
MCH RBC QN AUTO: 28.6 PG (ref 26.8–34.3)
MCHC RBC AUTO-ENTMCNC: 32.3 G/DL (ref 31.4–37.4)
MCV RBC AUTO: 89 FL (ref 82–98)
MONOCYTES # BLD AUTO: 0.33 THOUSAND/ΜL (ref 0.17–1.22)
MONOCYTES NFR BLD AUTO: 7 % (ref 4–12)
NEUTROPHILS # BLD AUTO: 2.33 THOUSANDS/ΜL (ref 1.85–7.62)
NEUTS SEG NFR BLD AUTO: 53 % (ref 43–75)
NRBC BLD AUTO-RTO: 0 /100 WBCS
PLATELET # BLD AUTO: 216 THOUSANDS/UL (ref 149–390)
PMV BLD AUTO: 10.6 FL (ref 8.9–12.7)
POTASSIUM SERPL-SCNC: 3.8 MMOL/L (ref 3.5–5.3)
PROT SERPL-MCNC: 7.1 G/DL (ref 6.4–8.4)
RBC # BLD AUTO: 5.24 MILLION/UL (ref 3.88–5.62)
SODIUM SERPL-SCNC: 139 MMOL/L (ref 135–147)
TIBC SERPL-MCNC: 331.8 UG/DL (ref 250–450)
TRANSFERRIN SERPL-MCNC: 237 MG/DL (ref 203–362)
TREPONEMA PALLIDUM IGG+IGM AB [PRESENCE] IN SERUM OR PLASMA BY IMMUNOASSAY: NORMAL
TRIGL SERPL-MCNC: 68 MG/DL (ref ?–150)
TSH SERPL DL<=0.05 MIU/L-ACNC: 0.6 UIU/ML (ref 0.45–4.5)
UIBC SERPL-MCNC: 230 UG/DL (ref 155–355)
VIT B12 SERPL-MCNC: 481 PG/ML (ref 180–914)
WBC # BLD AUTO: 4.46 THOUSAND/UL (ref 4.31–10.16)

## 2025-01-27 PROCEDURE — 84402 ASSAY OF FREE TESTOSTERONE: CPT

## 2025-01-27 PROCEDURE — 86780 TREPONEMA PALLIDUM: CPT

## 2025-01-27 PROCEDURE — 82728 ASSAY OF FERRITIN: CPT

## 2025-01-27 PROCEDURE — 87491 CHLMYD TRACH DNA AMP PROBE: CPT

## 2025-01-27 PROCEDURE — 84443 ASSAY THYROID STIM HORMONE: CPT

## 2025-01-27 PROCEDURE — 83540 ASSAY OF IRON: CPT

## 2025-01-27 PROCEDURE — 85025 COMPLETE CBC W/AUTO DIFF WBC: CPT

## 2025-01-27 PROCEDURE — 80061 LIPID PANEL: CPT

## 2025-01-27 PROCEDURE — 87591 N.GONORRHOEAE DNA AMP PROB: CPT

## 2025-01-27 PROCEDURE — 84403 ASSAY OF TOTAL TESTOSTERONE: CPT

## 2025-01-27 PROCEDURE — 83550 IRON BINDING TEST: CPT

## 2025-01-27 PROCEDURE — 36415 COLL VENOUS BLD VENIPUNCTURE: CPT

## 2025-01-27 PROCEDURE — 87389 HIV-1 AG W/HIV-1&-2 AB AG IA: CPT

## 2025-01-28 LAB
C TRACH DNA SPEC QL NAA+PROBE: NEGATIVE
N GONORRHOEA DNA SPEC QL NAA+PROBE: NEGATIVE

## 2025-01-29 LAB
TESTOST FREE SERPL-MCNC: 11.5 PG/ML (ref 8.7–25.1)
TESTOST SERPL-MCNC: 493 NG/DL (ref 264–916)

## 2025-01-31 ENCOUNTER — RESULTS FOLLOW-UP (OUTPATIENT)
Dept: FAMILY MEDICINE CLINIC | Facility: CLINIC | Age: 40
End: 2025-01-31

## 2025-01-31 DIAGNOSIS — E55.9 VITAMIN D DEFICIENCY: Primary | ICD-10-CM

## 2025-02-12 ENCOUNTER — TELEPHONE (OUTPATIENT)
Age: 40
End: 2025-02-12

## 2025-02-12 NOTE — TELEPHONE ENCOUNTER
New Patient    Appointment Scheduling  What office location does the patient prefer?: [place]  What is the reason for the patient's appointment?: Patient referred by PCP due to ED and nocturia   Have patient records been requested?:  If No, are the records showing in Epic: In epic    Appointment Details  Date: 3/3  Time:    3pm  Location:   [place]   Provider:  Shona Moody  Does the appointment need further review? (Reason) Scheduled appropriately       HISTORY  Is the patient having active symptoms? If so, describe symptoms: Patient having increased urinary frequency at night time.   Has the patient had any previous Urologist(s)?: Previous patient of Dr Arguello's over 3 years ago   Was the patient seen in the ED?: No  Has the patient had any outside testing done?: No  Does patient have Imaging/Lab Results: No  Does the patient have a personal history of any cancer?: n/a    INSURANCE   Have you confirmed Patient's insurance?  Yes [employer]  Is the insurance accepted?  Yes  Is the insurance active? Insurance in chart was coming back e-rejected but patient did not have his insurance card on him to verify ID number. I did request patient to call back before his appt with his insurance information

## 2025-02-28 NOTE — PROGRESS NOTES
Name: Stephane Sheikh      : 1985      MRN: [id number]  Encounter Provider: GIULIANO Corral  Encounter Date: 3/3/2025   Encounter department: [facility] UROLOGY [place]  :  Assessment & Plan  Nocturia  Patient reports nocturia 2x/night over the past 3 months   He also reports lower abdominal pain and feelings of incomplete bladder emptying, having to strain to void on occasion   He also reports some burning with urination   He denies a history of kidney stones   UA today negative for infection or blood   PVR 40mls   Recommend pelvic floor physical therapy to evaluate for hypertonic pelvic floor muscles   Consider starting Alfuzosin 10mg daily at next visit if he continues to have urinary symptoms   Follow up in 3 months   Orders:    POCT Measure PVR    POCT urine dip auto non-scope    Chronic pelvic pain in male  Patient reports lower abdominal pain, having to intermittently strain to void, feeling like he doesn't fully empty his bladder, does have some burning intermittently with urination  Referral for pelvic floor PT  Orders:    Ambulatory Referral to Physical Therapy; Future    Screening for prostate cancer  Patient requesting PSA screening   Denies family history of prostate cancer   Orders:    PSA, Total Screen; Future        History of Present Illness   Stephane Sheikh is a 39 y.o. male who presents as a new patient for evaluation of nocturia and ongoing lower abdominal pain. He reports chronic lower abdominal pain, feelings of incomplete bladder emptying, feeling like he has to strain to void on occasion, intermittent burning and nocturia 2x/night. He was seen in  with similar symptoms. He has been treated for prostatitis in  and  but many symptoms continued. He underwent cystoscopy for evaluation of microscopic hematuria in  which was unremarkable. He denies a history of UTI's or kidney stones.       Review of Systems   Constitutional:  Negative for chills,  diaphoresis, fatigue and fever.   Respiratory:  Negative for cough and shortness of breath.    Gastrointestinal:  Positive for abdominal pain. Negative for diarrhea, nausea and vomiting.   Genitourinary:  Negative for decreased urine volume, difficulty urinating, dysuria, flank pain, frequency, hematuria and urgency.        Burning, straining   Musculoskeletal:  Negative for back pain and myalgias.   Skin:  Negative for pallor and wound.   Neurological:  Negative for dizziness, weakness, light-headedness and numbness.       Pertinent Medical History          Medical History Reviewed by provider this encounter:     .  Past Medical History   Past Medical History:   Diagnosis Date    Dizziness     hx head injury when child-gets dizzy and headaches     History reviewed. No pertinent surgical history.  Family History   Problem Relation Age of Onset    No Known Problems Mother     Hypertension Father     Hypertension Maternal Grandmother     Vision loss Maternal Grandfather     No Known Problems Paternal Grandmother     No Known Problems Paternal Grandfather       reports that he has never smoked. He has never used smokeless tobacco. He reports current alcohol use. He reports that he does not use drugs.  Current Outpatient Medications   Medication Instructions    clotrimazole-betamethasone (LOTRISONE) 1-0.05 % cream Topical, 2 times daily    cyclobenzaprine (FLEXERIL) 10 mg, Oral, Daily at bedtime PRN    ketoconazole (NIZORAL) 2 % shampoo 1 Application, Topical, 2 times weekly    pantoprazole (PROTONIX) 40 mg    sodium chloride (OCEAN) 0.65 % nasal spray 1 spray, Nasal, As needed    valACYclovir (VALTREX) 1,000 mg, Oral, 2 times daily    Vitamin D3 5,000 Units, Oral, Daily   No Known Allergies   Current Outpatient Medications on File Prior to Visit   Medication Sig Dispense Refill    Cholecalciferol (Vitamin D3) 125 MCG (5000 UT) CAPS Take 1 capsule (5,000 Units total) by mouth daily 90 capsule 3    sodium chloride  "(OCEAN) 0.65 % nasal spray 1 spray into each nostril as needed (nasal irriation) 30 mL 5    valACYclovir (VALTREX) 1,000 mg tablet Take 1 tablet (1,000 mg total) by mouth 2 (two) times a day 20 tablet 5    clotrimazole-betamethasone (LOTRISONE) 1-0.05 % cream Apply topically 2 (two) times a day (Patient not taking: Reported on 10/30/2024) 30 g 5    cyclobenzaprine (FLEXERIL) 10 mg tablet Take 1 tablet (10 mg total) by mouth daily at bedtime as needed for muscle spasms (Patient not taking: Reported on 10/30/2024) 90 tablet 0    ketoconazole (NIZORAL) 2 % shampoo Apply 1 Application topically 2 (two) times a week (Patient not taking: Reported on 10/30/2024) 1 mL 5    pantoprazole (PROTONIX) 40 mg tablet Take 40 mg by mouth (Patient not taking: Reported on 8/19/2021)       No current facility-administered medications on file prior to visit.      Social History     Tobacco Use    Smoking status: Never    Smokeless tobacco: Never   Vaping Use    Vaping status: Never Used   Substance and Sexual Activity    Alcohol use: Yes     Comment: social    Drug use: No    Sexual activity: Yes     Partners: Female        Objective   /70 (BP Location: Left arm, Patient Position: Sitting, Cuff Size: Large)   Pulse 88   Ht 5' 6\" (1.676 m)   Wt 72.6 kg (160 lb)   SpO2 98%   BMI 25.82 kg/m²     Physical Exam  Constitutional:       Appearance: Normal appearance.   HENT:      Head: Normocephalic and atraumatic.   Eyes:      Conjunctiva/sclera: Conjunctivae normal.   Pulmonary:      Effort: Pulmonary effort is normal. No respiratory distress.   Musculoskeletal:         General: Normal range of motion.      Cervical back: Normal range of motion.   Skin:     General: Skin is warm and dry.   Neurological:      General: No focal deficit present.      Mental Status: He is alert and oriented to person, place, and time.   Psychiatric:         Mood and Affect: Mood normal.         Behavior: Behavior normal.        Results   Lab Results "   Component Value Date    CALCIUM 9.4 01/27/2025    K 3.8 01/27/2025    CO2 31 01/27/2025     01/27/2025    BUN 19 01/27/2025    CREATININE 1.07 01/27/2025     Lab Results   Component Value Date    WBC 4.46 01/27/2025    HGB 15.0 01/27/2025    HCT 46.4 01/27/2025    MCV 89 01/27/2025     01/27/2025       Office Urine Dip  Recent Results (from the past hour)   POCT Measure PVR    Collection Time: 03/03/25  3:20 PM   Result Value Ref Range    POST-VOID RESIDUAL VOLUME, ML POC 40 mL   POCT urine dip auto non-scope    Collection Time: 03/03/25  3:41 PM   Result Value Ref Range     COLOR,UA yellow     CLARITY,UA clear     SPECIFIC GRAVITY,UA 1.020      PH,UA 7.0     LEUKOCYTE ESTERASE,UA Neg     NITRITE,UA Neg     GLUCOSE, UA Neg     KETONES,UA Neg     BILIRUBIN,UA Neg     BLOOD,UA Neg     POCT URINE PROTEIN Neg     SL AMB POCT UROBILINOGEN 0.2

## 2025-03-03 ENCOUNTER — OFFICE VISIT (OUTPATIENT)
Dept: UROLOGY | Facility: MEDICAL CENTER | Age: 40
End: 2025-03-03
Payer: COMMERCIAL

## 2025-03-03 VITALS
SYSTOLIC BLOOD PRESSURE: 118 MMHG | OXYGEN SATURATION: 98 % | HEIGHT: 66 IN | BODY MASS INDEX: 25.71 KG/M2 | WEIGHT: 160 LBS | DIASTOLIC BLOOD PRESSURE: 70 MMHG | HEART RATE: 88 BPM

## 2025-03-03 DIAGNOSIS — N52.9 ERECTILE DYSFUNCTION, UNSPECIFIED ERECTILE DYSFUNCTION TYPE: Primary | ICD-10-CM

## 2025-03-03 DIAGNOSIS — R35.1 NOCTURIA: ICD-10-CM

## 2025-03-03 DIAGNOSIS — G89.29 CHRONIC PELVIC PAIN IN MALE: ICD-10-CM

## 2025-03-03 DIAGNOSIS — R10.2 CHRONIC PELVIC PAIN IN MALE: ICD-10-CM

## 2025-03-03 DIAGNOSIS — Z12.5 SCREENING FOR PROSTATE CANCER: ICD-10-CM

## 2025-03-03 LAB
POST-VOID RESIDUAL VOLUME, ML POC: 40 ML
SL AMB  POCT GLUCOSE, UA: NORMAL
SL AMB LEUKOCYTE ESTERASE,UA: NORMAL
SL AMB POCT BILIRUBIN,UA: NORMAL
SL AMB POCT BLOOD,UA: NORMAL
SL AMB POCT CLARITY,UA: CLEAR
SL AMB POCT COLOR,UA: YELLOW
SL AMB POCT KETONES,UA: NORMAL
SL AMB POCT NITRITE,UA: NORMAL
SL AMB POCT PH,UA: 7
SL AMB POCT SPECIFIC GRAVITY,UA: 1.02
SL AMB POCT URINE PROTEIN: NORMAL
SL AMB POCT UROBILINOGEN: 0.2

## 2025-03-03 PROCEDURE — 81003 URINALYSIS AUTO W/O SCOPE: CPT

## 2025-03-03 PROCEDURE — 51798 US URINE CAPACITY MEASURE: CPT

## 2025-03-03 PROCEDURE — 99204 OFFICE O/P NEW MOD 45 MIN: CPT

## 2025-03-03 NOTE — ASSESSMENT & PLAN NOTE
Patient reports nocturia 2x/night over the past 3 months   He also reports lower abdominal pain and feelings of incomplete bladder emptying, having to strain to void on occasion   He also reports some burning with urination   He denies a history of kidney stones   UA today negative for infection or blood   PVR 40mls   Recommend pelvic floor physical therapy to evaluate for hypertonic pelvic floor muscles   Consider starting Alfuzosin 10mg daily at next visit if he continues to have urinary symptoms   Follow up in 3 months   Orders:    POCT Measure PVR    POCT urine dip auto non-scope